# Patient Record
Sex: MALE | NOT HISPANIC OR LATINO | Employment: OTHER | URBAN - NONMETROPOLITAN AREA
[De-identification: names, ages, dates, MRNs, and addresses within clinical notes are randomized per-mention and may not be internally consistent; named-entity substitution may affect disease eponyms.]

---

## 2023-03-29 ENCOUNTER — TELEPHONE (OUTPATIENT)
Dept: PRIMARY CARE | Facility: CLINIC | Age: 83
End: 2023-03-29
Payer: MEDICARE

## 2023-03-29 DIAGNOSIS — J02.9 ACUTE PHARYNGITIS, UNSPECIFIED ETIOLOGY: Primary | ICD-10-CM

## 2023-03-29 RX ORDER — AZITHROMYCIN 500 MG/1
500 TABLET, FILM COATED ORAL DAILY
Qty: 5 TABLET | Refills: 0 | Status: SHIPPED | OUTPATIENT
Start: 2023-03-29 | End: 2023-04-06 | Stop reason: ALTCHOICE

## 2023-03-29 NOTE — TELEPHONE ENCOUNTER
Patient called stating he has had 3-4 days of HA, sinus congestion with clear drainage, non-productive cough.  Denies fever.  Requesting medications to [facility].

## 2023-04-06 ENCOUNTER — OFFICE VISIT (OUTPATIENT)
Dept: PRIMARY CARE | Facility: CLINIC | Age: 83
End: 2023-04-06
Payer: MEDICARE

## 2023-04-06 VITALS
BODY MASS INDEX: 17.58 KG/M2 | SYSTOLIC BLOOD PRESSURE: 118 MMHG | HEART RATE: 85 BPM | DIASTOLIC BLOOD PRESSURE: 74 MMHG | WEIGHT: 116 LBS | HEIGHT: 68 IN

## 2023-04-06 DIAGNOSIS — K59.00 CONSTIPATION, UNSPECIFIED CONSTIPATION TYPE: ICD-10-CM

## 2023-04-06 DIAGNOSIS — R63.4 UNINTENTIONAL WEIGHT LOSS: ICD-10-CM

## 2023-04-06 DIAGNOSIS — Z09 HOSPITAL DISCHARGE FOLLOW-UP: ICD-10-CM

## 2023-04-06 DIAGNOSIS — F32.A MILD DEPRESSION: ICD-10-CM

## 2023-04-06 DIAGNOSIS — I35.0 AORTIC STENOSIS, MODERATE: ICD-10-CM

## 2023-04-06 DIAGNOSIS — R35.0 BENIGN PROSTATIC HYPERPLASIA WITH URINARY FREQUENCY: ICD-10-CM

## 2023-04-06 DIAGNOSIS — J44.9 OBSTRUCTIVE LUNG DISEASE (MULTI): Primary | ICD-10-CM

## 2023-04-06 DIAGNOSIS — E78.2 MIXED HYPERLIPIDEMIA: ICD-10-CM

## 2023-04-06 DIAGNOSIS — I48.11 LONGSTANDING PERSISTENT ATRIAL FIBRILLATION (MULTI): ICD-10-CM

## 2023-04-06 DIAGNOSIS — K21.9 GASTROESOPHAGEAL REFLUX DISEASE WITHOUT ESOPHAGITIS: ICD-10-CM

## 2023-04-06 DIAGNOSIS — K59.09 OTHER CONSTIPATION: ICD-10-CM

## 2023-04-06 DIAGNOSIS — N40.1 BENIGN PROSTATIC HYPERPLASIA WITH URINARY FREQUENCY: ICD-10-CM

## 2023-04-06 DIAGNOSIS — I10 BENIGN HYPERTENSION: ICD-10-CM

## 2023-04-06 DIAGNOSIS — L89.156 PRESSURE INJURY OF DEEP TISSUE OF SACRAL REGION: ICD-10-CM

## 2023-04-06 PROBLEM — L89.159 SACRAL PRESSURE SORE: Status: ACTIVE | Noted: 2023-04-06

## 2023-04-06 PROBLEM — I48.91 ATRIAL FIBRILLATION (MULTI): Status: ACTIVE | Noted: 2023-04-06

## 2023-04-06 PROBLEM — R11.10 VOMITING: Status: RESOLVED | Noted: 2023-04-06 | Resolved: 2023-04-06

## 2023-04-06 PROBLEM — H53.8 BLURRY VISION: Status: ACTIVE | Noted: 2023-04-06

## 2023-04-06 PROBLEM — R11.10 VOMITING: Status: ACTIVE | Noted: 2023-04-06

## 2023-04-06 PROBLEM — A08.4 VIRAL GASTROENTERITIS: Status: RESOLVED | Noted: 2023-04-06 | Resolved: 2023-04-06

## 2023-04-06 PROBLEM — J90 BILATERAL PLEURAL EFFUSION: Status: ACTIVE | Noted: 2023-04-06

## 2023-04-06 PROBLEM — M54.9 BACK PAIN: Status: ACTIVE | Noted: 2023-04-06

## 2023-04-06 PROBLEM — J30.2 SEASONAL ALLERGIES: Status: ACTIVE | Noted: 2023-04-06

## 2023-04-06 PROBLEM — T78.40XA ALLERGIES: Status: ACTIVE | Noted: 2023-04-06

## 2023-04-06 PROBLEM — M67.40 GANGLION CYST: Status: ACTIVE | Noted: 2023-04-06

## 2023-04-06 PROBLEM — J45.50 SEVERE PERSISTENT ASTHMA (MULTI): Status: ACTIVE | Noted: 2023-04-06

## 2023-04-06 PROBLEM — B37.49 CANDIDA UTI: Status: ACTIVE | Noted: 2023-04-06

## 2023-04-06 PROBLEM — L98.9 SKIN LESION: Status: ACTIVE | Noted: 2023-04-06

## 2023-04-06 PROBLEM — H65.92: Status: ACTIVE | Noted: 2023-04-06

## 2023-04-06 PROBLEM — Q23.1 AORTIC REGURGITATION, CONGENITAL (HHS-HCC): Status: ACTIVE | Noted: 2023-04-06

## 2023-04-06 PROBLEM — M71.9 BURSITIS: Status: ACTIVE | Noted: 2023-04-06

## 2023-04-06 PROBLEM — R19.7 DIARRHEA: Status: ACTIVE | Noted: 2023-04-06

## 2023-04-06 PROBLEM — E78.5 HYPERLIPIDEMIA: Status: ACTIVE | Noted: 2023-04-06

## 2023-04-06 PROBLEM — J06.9 ACUTE URI: Status: ACTIVE | Noted: 2023-04-06

## 2023-04-06 PROBLEM — H81.10 BPPV (BENIGN PAROXYSMAL POSITIONAL VERTIGO): Status: ACTIVE | Noted: 2023-04-06

## 2023-04-06 PROBLEM — A08.4 VIRAL GASTROENTERITIS: Status: ACTIVE | Noted: 2023-04-06

## 2023-04-06 PROBLEM — R60.0 LOWER EXTREMITY EDEMA: Status: ACTIVE | Noted: 2023-04-06

## 2023-04-06 PROBLEM — N40.0 BPH (BENIGN PROSTATIC HYPERPLASIA): Status: ACTIVE | Noted: 2023-04-06

## 2023-04-06 PROBLEM — D63.8 ANEMIA, CHRONIC DISEASE: Status: ACTIVE | Noted: 2023-04-06

## 2023-04-06 PROBLEM — M47.812 SPONDYLOSIS OF CERVICAL REGION WITHOUT MYELOPATHY OR RADICULOPATHY: Status: ACTIVE | Noted: 2023-04-06

## 2023-04-06 PROBLEM — G47.33 OBSTRUCTIVE SLEEP APNEA: Status: ACTIVE | Noted: 2023-04-06

## 2023-04-06 PROBLEM — R42 DIZZINESS: Status: ACTIVE | Noted: 2023-04-06

## 2023-04-06 PROBLEM — H61.22 IMPACTED CERUMEN OF LEFT EAR: Status: ACTIVE | Noted: 2023-04-06

## 2023-04-06 PROBLEM — K44.9 HIATAL HERNIA: Status: ACTIVE | Noted: 2023-04-06

## 2023-04-06 PROBLEM — R07.9 CHEST PAIN: Status: ACTIVE | Noted: 2023-03-31

## 2023-04-06 PROBLEM — I65.29 CAROTID ARTERY STENOSIS: Status: ACTIVE | Noted: 2023-04-06

## 2023-04-06 PROBLEM — R13.10 DYSPHAGIA: Status: ACTIVE | Noted: 2023-04-06

## 2023-04-06 PROCEDURE — 1160F RVW MEDS BY RX/DR IN RCRD: CPT | Performed by: INTERNAL MEDICINE

## 2023-04-06 PROCEDURE — 1036F TOBACCO NON-USER: CPT | Performed by: INTERNAL MEDICINE

## 2023-04-06 PROCEDURE — 1159F MED LIST DOCD IN RCRD: CPT | Performed by: INTERNAL MEDICINE

## 2023-04-06 PROCEDURE — 3078F DIAST BP <80 MM HG: CPT | Performed by: INTERNAL MEDICINE

## 2023-04-06 PROCEDURE — 3074F SYST BP LT 130 MM HG: CPT | Performed by: INTERNAL MEDICINE

## 2023-04-06 PROCEDURE — 99214 OFFICE O/P EST MOD 30 MIN: CPT | Performed by: INTERNAL MEDICINE

## 2023-04-06 PROCEDURE — 1157F ADVNC CARE PLAN IN RCRD: CPT | Performed by: INTERNAL MEDICINE

## 2023-04-06 RX ORDER — PANTOPRAZOLE SODIUM 40 MG/1
40 TABLET, DELAYED RELEASE ORAL 2 TIMES DAILY
COMMUNITY
Start: 2019-05-25 | End: 2023-06-30 | Stop reason: SDUPTHER

## 2023-04-06 RX ORDER — METOPROLOL TARTRATE 25 MG/1
1 TABLET, FILM COATED ORAL 2 TIMES DAILY
COMMUNITY
Start: 2022-04-26 | End: 2023-05-19 | Stop reason: SDUPTHER

## 2023-04-06 RX ORDER — CITALOPRAM 10 MG/1
10 TABLET ORAL DAILY
Qty: 30 TABLET | Refills: 5 | Status: SHIPPED | OUTPATIENT
Start: 2023-04-06 | End: 2023-05-19 | Stop reason: ALTCHOICE

## 2023-04-06 RX ORDER — TAMSULOSIN HYDROCHLORIDE 0.4 MG/1
CAPSULE ORAL
COMMUNITY
Start: 2020-01-16 | End: 2024-04-24 | Stop reason: SDUPTHER

## 2023-04-06 RX ORDER — ADHESIVE BANDAGE
30 BANDAGE TOPICAL DAILY
Qty: 360 ML | Refills: 0 | Status: SHIPPED | OUTPATIENT
Start: 2023-04-06 | End: 2023-04-16

## 2023-04-06 RX ORDER — WARFARIN 2 MG/1
2 TABLET ORAL SEE ADMIN INSTRUCTIONS
COMMUNITY
Start: 2022-02-24 | End: 2023-06-22

## 2023-04-06 RX ORDER — NAPROXEN SODIUM 220 MG/1
81 TABLET, FILM COATED ORAL DAILY
COMMUNITY

## 2023-04-06 RX ORDER — NYSTATIN AND TRIAMCINOLONE ACETONIDE 100000; 1 [USP'U]/G; MG/G
CREAM TOPICAL 2 TIMES DAILY
Qty: 15 G | Refills: 1 | Status: SHIPPED | OUTPATIENT
Start: 2023-04-06 | End: 2024-04-24 | Stop reason: WASHOUT

## 2023-04-06 RX ORDER — FLUTICASONE PROPIONATE 50 MCG
2 SPRAY, SUSPENSION (ML) NASAL DAILY
COMMUNITY
End: 2024-04-24 | Stop reason: SDUPTHER

## 2023-04-06 RX ORDER — GABAPENTIN 100 MG/1
100 CAPSULE ORAL NIGHTLY
COMMUNITY
Start: 2022-09-09

## 2023-04-06 RX ORDER — MONTELUKAST SODIUM 10 MG/1
10 TABLET ORAL NIGHTLY
COMMUNITY
End: 2024-02-13

## 2023-04-06 RX ORDER — SERTRALINE HYDROCHLORIDE 50 MG/1
50 TABLET, FILM COATED ORAL DAILY
COMMUNITY
End: 2023-04-06

## 2023-04-06 RX ORDER — DILTIAZEM HYDROCHLORIDE 180 MG/1
1 CAPSULE, COATED, EXTENDED RELEASE ORAL DAILY
COMMUNITY
Start: 2023-01-19 | End: 2023-05-19 | Stop reason: SDUPTHER

## 2023-04-06 RX ORDER — DOCUSATE SODIUM 100 MG/1
100 CAPSULE, LIQUID FILLED ORAL 2 TIMES DAILY
Qty: 60 CAPSULE | Refills: 2 | Status: SHIPPED | OUTPATIENT
Start: 2023-04-06 | End: 2024-04-24 | Stop reason: WASHOUT

## 2023-04-06 RX ORDER — ALBUTEROL SULFATE 90 UG/1
2 AEROSOL, METERED RESPIRATORY (INHALATION) EVERY 6 HOURS
COMMUNITY
Start: 2023-04-02 | End: 2023-10-13 | Stop reason: WASHOUT

## 2023-04-06 ASSESSMENT — ENCOUNTER SYMPTOMS
RECTAL PAIN: 0
APPETITE CHANGE: 0
FEVER: 0
DIZZINESS: 0
DIFFICULTY URINATING: 0
ACTIVITY CHANGE: 0
NAUSEA: 0
RHINORRHEA: 0
CHILLS: 0
MYALGIAS: 0
FLANK PAIN: 0
DEPRESSION: 1

## 2023-04-06 ASSESSMENT — PATIENT HEALTH QUESTIONNAIRE - PHQ9
SUM OF ALL RESPONSES TO PHQ9 QUESTIONS 1 AND 2: 2
1. LITTLE INTEREST OR PLEASURE IN DOING THINGS: SEVERAL DAYS
2. FEELING DOWN, DEPRESSED OR HOPELESS: SEVERAL DAYS

## 2023-04-06 NOTE — ASSESSMENT & PLAN NOTE
Reviewed hospital records from [facility], diagnosed with CAP and acute on chronic respiratory failure with hypoxia, incidentally found kidney stone. PT finished antibiotics on discharge ,reports feeling better still a little weak.  Had follow up with Dr Zhou to devise treatment plan of kidney stone .

## 2023-04-06 NOTE — ASSESSMENT & PLAN NOTE
- Unintentional weight loss likely multifactorial, depression, dysphagia, decreased appetite, intermittent vomiting or food getting stuck, lonely, does not like cooking for himself, weight has been stable, down to 116   - having some trouble sleeping   - pt agreeable to try a different SSRI, will try celexa 10mg po daily   - pt declines counseling referral

## 2023-04-06 NOTE — PROGRESS NOTES
Subjective   Patient ID: Evangelina Braxton is a 82 y.o. male who presents for Hospital Follow-up (Pt was diagnosed with pneumonia 1 week ago/Admitted to [facility] for 4 days/Pt reports weakness and fatigue; states its rough getting his strength back/Reports no appetite and no BM since last Thursday; trying to eat more fiber when he is hungry/He was also told he has kidney stones; following Dr. Zhou) and Depression (Stopped taking the Zoloft; wasn't sure it was helping/Pt states his daughter thinks he needs to be on medication/Pt reports a lot of depression in the hospital; states it really messed him up).  Depression    Patient is here today for Hospital follow up.  Pt states that he had the chills and a cough, he states that it started the day before he went to the hospital all of a sudden. He was found to have a left sided pna and was admited for acute on chronic respiratory failure with hypoxia due to copd. He had incidental finding of a kidney stone.     He saw Dr Zhou yesterday and is supposed to have a KUB and will finalize treatment plan for kidney stone, pt denies blood in his urine, urinary frequency or flank pain.     Pt reports feeling depressed, had tried sertraline but that did not help. Will try a different SSRI.     He has not had a bowel movement in over a week.      Review of Systems   Constitutional:  Negative for activity change, appetite change, chills and fever.   HENT:  Negative for congestion, nosebleeds and rhinorrhea.    Cardiovascular:  Negative for chest pain and leg swelling.   Gastrointestinal:  Negative for nausea and rectal pain.   Genitourinary:  Negative for decreased urine volume, difficulty urinating, flank pain and urgency.   Musculoskeletal:  Negative for myalgias.   Neurological:  Negative for dizziness.   Psychiatric/Behavioral:  Positive for depression.         +depression       Objective   /74 (BP Location: Right arm, Patient Position: Sitting, BP Cuff Size: Adult)    "Pulse 85   Ht 1.727 m (5' 8\")   Wt 52.6 kg (116 lb)   BMI 17.64 kg/m²     Physical Exam  Constitutional:       General: He is not in acute distress.     Appearance: Normal appearance.   HENT:      Head: Normocephalic.      Nose: Nose normal.      Mouth/Throat:      Mouth: Mucous membranes are dry.      Pharynx: No oropharyngeal exudate.   Eyes:      General:         Right eye: No discharge.         Left eye: No discharge.      Extraocular Movements: Extraocular movements intact.      Pupils: Pupils are equal, round, and reactive to light.   Cardiovascular:      Rate and Rhythm: Normal rate. Rhythm irregular.      Heart sounds: No murmur heard.     No gallop.   Pulmonary:      Effort: Pulmonary effort is normal. No respiratory distress.      Breath sounds: Normal breath sounds. No wheezing.   Musculoskeletal:         General: No swelling. Normal range of motion.   Skin:     General: Skin is warm and dry.      Coloration: Skin is not jaundiced.   Neurological:      General: No focal deficit present.      Mental Status: He is alert and oriented to person, place, and time.      Cranial Nerves: No cranial nerve deficit.   Psychiatric:         Mood and Affect: Mood normal.         Behavior: Behavior normal.           Assessment/Plan   Problem List Items Addressed This Visit          Respiratory    Obstructive lung disease (CMS/HCC) - Primary     - continue singulair 10mg po dialy   - continue flonase   - albuterol prn             Circulatory    Aortic stenosis, moderate    Atrial fibrillation (CMS/HCC)     - continue Cardizem   - continue warfarin as directed by [place]   - continue metoprolol           Benign hypertension     - controlled  - continue metoprolol, cardizem             Digestive    GERD (gastroesophageal reflux disease)     - s/p nissen  - had return of gerd symptoms and vomiting   - continue ppi          Other constipation     - will order colace 100mg po bid   - milk of magnesia if does not have a bowel " movement in next few days he is to call and will rx lactulose             Genitourinary    BPH (benign prostatic hyperplasia)       Endocrine/Metabolic    Unintentional weight loss     - Unintentional weight loss likely multifactorial, depression, dysphagia, decreased appetite, intermittent vomiting or food getting stuck, lonely, does not like cooking for himself, weight has been stable, down to 116   - having some trouble sleeping   - pt agreeable to try a different SSRI, will try celexa 10mg po daily   - pt declines counseling referral             Other    Hyperlipidemia    Mild depression     - will start celexa 10mg po daily          Relevant Medications    citalopram (CeleXA) 10 mg tablet    Sacral pressure sore     - send different rx to try          Relevant Medications    nystatin-triamcinolone (Mycolog II) cream    Hospital discharge follow-up     Reviewed hospital records from [facility], diagnosed with CAP and acute on chronic respiratory failure with hypoxia, incidentally found kidney stone. PT finished antibiotics on discharge ,reports feeling better still a little weak.  Had follow up with Dr Zhou to devise treatment plan of kidney stone .          Other Visit Diagnoses       Constipation, unspecified constipation type        Relevant Medications    docusate sodium (Colace) 100 mg capsule    magnesium hydroxide (Milk of Magnesia) 400 mg/5 mL suspension          Will see him back in 6 weeks for weight check and depression follow up      Final diagnoses:   [J44.9] Obstructive lung disease (CMS/HCC)   [I35.0] Aortic stenosis, moderate   [I48.11] Longstanding persistent atrial fibrillation (CMS/HCC)   [I10] Benign hypertension   [K21.9] Gastroesophageal reflux disease without esophagitis   [N40.1, R35.0] Benign prostatic hyperplasia with urinary frequency   [E78.2] Mixed hyperlipidemia   [F32.A] Mild depression   [L89.156] Pressure injury of deep tissue of sacral region   [K59.00] Constipation,  unspecified constipation type   [K59.09] Other constipation   [R63.4] Unintentional weight loss   [Z09] Hospital discharge follow-up

## 2023-04-06 NOTE — ASSESSMENT & PLAN NOTE
- will order colace 100mg po bid   - milk of magnesia if does not have a bowel movement in next few days he is to call and will rx lactulose

## 2023-05-19 ENCOUNTER — OFFICE VISIT (OUTPATIENT)
Dept: PRIMARY CARE | Facility: CLINIC | Age: 83
End: 2023-05-19
Payer: MEDICARE

## 2023-05-19 VITALS
BODY MASS INDEX: 18.94 KG/M2 | WEIGHT: 125 LBS | SYSTOLIC BLOOD PRESSURE: 118 MMHG | DIASTOLIC BLOOD PRESSURE: 66 MMHG | HEART RATE: 84 BPM | HEIGHT: 68 IN

## 2023-05-19 DIAGNOSIS — I48.11 LONGSTANDING PERSISTENT ATRIAL FIBRILLATION (MULTI): Primary | ICD-10-CM

## 2023-05-19 DIAGNOSIS — F32.A MILD DEPRESSION: ICD-10-CM

## 2023-05-19 PROCEDURE — 3078F DIAST BP <80 MM HG: CPT | Performed by: INTERNAL MEDICINE

## 2023-05-19 PROCEDURE — 1157F ADVNC CARE PLAN IN RCRD: CPT | Performed by: INTERNAL MEDICINE

## 2023-05-19 PROCEDURE — 99213 OFFICE O/P EST LOW 20 MIN: CPT | Performed by: INTERNAL MEDICINE

## 2023-05-19 PROCEDURE — 1159F MED LIST DOCD IN RCRD: CPT | Performed by: INTERNAL MEDICINE

## 2023-05-19 PROCEDURE — 1160F RVW MEDS BY RX/DR IN RCRD: CPT | Performed by: INTERNAL MEDICINE

## 2023-05-19 PROCEDURE — 1036F TOBACCO NON-USER: CPT | Performed by: INTERNAL MEDICINE

## 2023-05-19 PROCEDURE — 3074F SYST BP LT 130 MM HG: CPT | Performed by: INTERNAL MEDICINE

## 2023-05-19 RX ORDER — DILTIAZEM HYDROCHLORIDE 180 MG/1
180 CAPSULE, COATED, EXTENDED RELEASE ORAL DAILY
Qty: 90 CAPSULE | Refills: 2 | Status: SHIPPED | OUTPATIENT
Start: 2023-05-19 | End: 2023-06-30 | Stop reason: SDUPTHER

## 2023-05-19 RX ORDER — METOPROLOL TARTRATE 25 MG/1
25 TABLET, FILM COATED ORAL 2 TIMES DAILY
Qty: 180 TABLET | Refills: 3 | Status: SHIPPED | OUTPATIENT
Start: 2023-05-19 | End: 2024-04-24 | Stop reason: SDUPTHER

## 2023-05-19 RX ORDER — CITALOPRAM 20 MG/1
20 TABLET, FILM COATED ORAL DAILY
Qty: 30 TABLET | Refills: 1 | Status: SHIPPED | OUTPATIENT
Start: 2023-05-19 | End: 2023-12-12 | Stop reason: ALTCHOICE

## 2023-05-19 ASSESSMENT — PATIENT HEALTH QUESTIONNAIRE - PHQ9
1. LITTLE INTEREST OR PLEASURE IN DOING THINGS: MORE THAN HALF THE DAYS
2. FEELING DOWN, DEPRESSED OR HOPELESS: MORE THAN HALF THE DAYS
SUM OF ALL RESPONSES TO PHQ9 QUESTIONS 1 AND 2: 4

## 2023-05-19 ASSESSMENT — ENCOUNTER SYMPTOMS: DEPRESSION: 1

## 2023-05-19 NOTE — PROGRESS NOTES
"Subjective   Patient ID: Evangelina Braxton is a 82 y.o. male who presents for Depression (+screening/Celexa not working ) and Follow-up (6 week).  Depression    Patient is here today for 6 week follow up on depression.  HE reports that he has not noticed much of a change with the medication yet but no side effects with it.   Discussed counseling, he is thinking about it.   He used to see them at the [place].     Review of Systems   Psychiatric/Behavioral:  Positive for depression.         +depression       Objective   /66 (BP Location: Right arm, Patient Position: Sitting, BP Cuff Size: Adult)   Pulse 84   Ht 1.727 m (5' 8\")   Wt 56.7 kg (125 lb)   BMI 19.01 kg/m²     Physical Exam  Constitutional:       General: He is not in acute distress.     Appearance: Normal appearance.   Neurological:      Mental Status: He is alert.   Psychiatric:         Mood and Affect: Mood normal.         Behavior: Behavior normal.         Thought Content: Thought content normal.           Assessment/Plan   Problem List Items Addressed This Visit          Other    Mild depression     Depression, no change so far   - will increase celexa to 20mg po daily   - recommend counseling [facility]   - follow up in 6 weeks   Final diagnoses:   [F32.A] Mild depression     "

## 2023-05-24 ENCOUNTER — APPOINTMENT (OUTPATIENT)
Dept: PRIMARY CARE | Facility: CLINIC | Age: 83
End: 2023-05-24
Payer: MEDICARE

## 2023-06-22 DIAGNOSIS — I48.11 LONGSTANDING PERSISTENT ATRIAL FIBRILLATION (MULTI): ICD-10-CM

## 2023-06-22 DIAGNOSIS — I35.0 AORTIC STENOSIS, MODERATE: Primary | ICD-10-CM

## 2023-06-22 RX ORDER — WARFARIN 2 MG/1
TABLET ORAL
Qty: 90 TABLET | Refills: 1 | Status: SHIPPED | OUTPATIENT
Start: 2023-06-22 | End: 2024-04-04

## 2023-06-30 ENCOUNTER — OFFICE VISIT (OUTPATIENT)
Dept: PRIMARY CARE | Facility: CLINIC | Age: 83
End: 2023-06-30
Payer: MEDICARE

## 2023-06-30 VITALS
HEART RATE: 86 BPM | WEIGHT: 124 LBS | SYSTOLIC BLOOD PRESSURE: 126 MMHG | BODY MASS INDEX: 18.79 KG/M2 | DIASTOLIC BLOOD PRESSURE: 65 MMHG | HEIGHT: 68 IN

## 2023-06-30 DIAGNOSIS — F32.A MILD DEPRESSION: ICD-10-CM

## 2023-06-30 DIAGNOSIS — J44.9 OBSTRUCTIVE LUNG DISEASE (MULTI): ICD-10-CM

## 2023-06-30 DIAGNOSIS — I48.11 LONGSTANDING PERSISTENT ATRIAL FIBRILLATION (MULTI): ICD-10-CM

## 2023-06-30 DIAGNOSIS — J06.9 ACUTE URI: Primary | ICD-10-CM

## 2023-06-30 PROCEDURE — 3078F DIAST BP <80 MM HG: CPT | Performed by: INTERNAL MEDICINE

## 2023-06-30 PROCEDURE — 1159F MED LIST DOCD IN RCRD: CPT | Performed by: INTERNAL MEDICINE

## 2023-06-30 PROCEDURE — 3074F SYST BP LT 130 MM HG: CPT | Performed by: INTERNAL MEDICINE

## 2023-06-30 PROCEDURE — 99213 OFFICE O/P EST LOW 20 MIN: CPT | Performed by: INTERNAL MEDICINE

## 2023-06-30 PROCEDURE — 1036F TOBACCO NON-USER: CPT | Performed by: INTERNAL MEDICINE

## 2023-06-30 PROCEDURE — 1157F ADVNC CARE PLAN IN RCRD: CPT | Performed by: INTERNAL MEDICINE

## 2023-06-30 PROCEDURE — 1160F RVW MEDS BY RX/DR IN RCRD: CPT | Performed by: INTERNAL MEDICINE

## 2023-06-30 RX ORDER — CITALOPRAM 40 MG/1
40 TABLET, FILM COATED ORAL DAILY
Qty: 30 TABLET | Refills: 1 | Status: SHIPPED | OUTPATIENT
Start: 2023-06-30 | End: 2023-08-25 | Stop reason: ALTCHOICE

## 2023-06-30 RX ORDER — AMOXICILLIN AND CLAVULANATE POTASSIUM 875; 125 MG/1; MG/1
875 TABLET, FILM COATED ORAL 2 TIMES DAILY
Qty: 20 TABLET | Refills: 0 | Status: SHIPPED | OUTPATIENT
Start: 2023-06-30 | End: 2023-07-10

## 2023-06-30 RX ORDER — DILTIAZEM HYDROCHLORIDE 180 MG/1
180 CAPSULE, COATED, EXTENDED RELEASE ORAL DAILY
Qty: 90 CAPSULE | Refills: 2 | Status: SHIPPED | OUTPATIENT
Start: 2023-06-30 | End: 2023-12-12 | Stop reason: SDUPTHER

## 2023-06-30 RX ORDER — PANTOPRAZOLE SODIUM 40 MG/1
40 TABLET, DELAYED RELEASE ORAL 2 TIMES DAILY
Qty: 180 TABLET | Refills: 1 | Status: SHIPPED | OUTPATIENT
Start: 2023-06-30 | End: 2024-02-06 | Stop reason: SDUPTHER

## 2023-06-30 ASSESSMENT — ENCOUNTER SYMPTOMS
VOMITING: 0
SINUS PAIN: 0
ABDOMINAL DISTENTION: 0
BACK PAIN: 0
SHORTNESS OF BREATH: 0
DIARRHEA: 0
SINUS PRESSURE: 1
DEPRESSION: 1
NUMBNESS: 0
APPETITE CHANGE: 0
WEAKNESS: 0
NAUSEA: 0
FATIGUE: 0
CHILLS: 0
ACTIVITY CHANGE: 0
COUGH: 0
WHEEZING: 0
SORE THROAT: 0

## 2023-06-30 ASSESSMENT — PATIENT HEALTH QUESTIONNAIRE - PHQ9
1. LITTLE INTEREST OR PLEASURE IN DOING THINGS: MORE THAN HALF THE DAYS
SUM OF ALL RESPONSES TO PHQ9 QUESTIONS 1 AND 2: 4
2. FEELING DOWN, DEPRESSED OR HOPELESS: MORE THAN HALF THE DAYS

## 2023-06-30 NOTE — PROGRESS NOTES
"Subjective   Patient ID: Evagnelina Braxton is a 82 y.o. male who presents for Follow-up (1 month/Went to  and was given prednisone and ATB due to his congestion/Still feeling a little crummy; cough and runny nose  ) and Depression (Worsening /x3 sessions of counseling ).  DepressionPatient is not experiencing: shortness of breath.        Patient is here today for 1 mo follow up on depression.  PT has been seeing Counselor at the [facility], has done 3 sessions.  No noticed any difference with the 20mg of celexa  No side effects .     Pt was seen at the Urgent Care last Wed for cough and congestion, no fever. He was given antibiotic, he does feel like it is better.     Review of Systems   Constitutional:  Negative for activity change, appetite change, chills and fatigue.   HENT:  Positive for sinus pressure. Negative for congestion, postnasal drip, sinus pain and sore throat.    Respiratory:  Negative for cough, shortness of breath and wheezing.    Cardiovascular:  Negative for chest pain and leg swelling.   Gastrointestinal:  Negative for abdominal distention, diarrhea, nausea and vomiting.   Musculoskeletal:  Negative for back pain.   Neurological:  Negative for weakness and numbness.   Psychiatric/Behavioral:  Positive for depression.        Objective   /65 (BP Location: Right arm, Patient Position: Sitting, BP Cuff Size: Adult)   Pulse 86   Ht 1.727 m (5' 8\")   Wt 56.2 kg (124 lb)   BMI 18.85 kg/m²     Physical Exam  Constitutional:       General: He is not in acute distress.     Appearance: Normal appearance. He is not toxic-appearing.   HENT:      Head: Normocephalic and atraumatic.      Nose: Nose normal.      Mouth/Throat:      Mouth: Mucous membranes are moist.      Pharynx: Oropharynx is clear.   Eyes:      Extraocular Movements: Extraocular movements intact.      Conjunctiva/sclera: Conjunctivae normal.      Pupils: Pupils are equal, round, and reactive to light.   Cardiovascular:      Rate and " Rhythm: Normal rate and regular rhythm.      Heart sounds: No murmur heard.     No friction rub. No gallop.   Pulmonary:      Effort: Pulmonary effort is normal.      Breath sounds: Rhonchi present.   Abdominal:      General: Bowel sounds are normal. There is no distension.      Palpations: Abdomen is soft. There is no mass.      Tenderness: There is no abdominal tenderness. There is no guarding.   Musculoskeletal:         General: No swelling. Normal range of motion.      Cervical back: Normal range of motion.   Skin:     General: Skin is warm and dry.   Neurological:      General: No focal deficit present.      Mental Status: He is alert and oriented to person, place, and time.   Psychiatric:         Mood and Affect: Mood normal.         Thought Content: Thought content normal.         Judgment: Judgment normal.           Assessment/Plan   Problem List Items Addressed This Visit       Acute URI - Primary    Relevant Medications    amoxicillin-pot clavulanate (Augmentin) 875-125 mg tablet    Atrial fibrillation (CMS/HCC)    Relevant Medications    pantoprazole (ProtoNix) 40 mg EC tablet    dilTIAZem CD (Cardizem CD) 180 mg 24 hr capsule    Obstructive lung disease (CMS/HCC)    Mild depression    Relevant Medications    citalopram (CeleXA) 40 mg tablet     COPD exacerbation  - treated with doxycycline and prednisone   - has some rhonchi, advised if he ggets worse over holiday weekend to start augmentin and let [facility] know at Coumadin clinic     2. Depression, worsening    Will increase to celexa 40mg po daily   - continue counseling     3. Follow up in 2 mo   Final diagnoses:   [F32.A] Mild depression   [J06.9] Acute URI   [I48.11] Longstanding persistent atrial fibrillation (CMS/HCC)   [J44.9] Obstructive lung disease (CMS/HCC)

## 2023-08-25 ENCOUNTER — OFFICE VISIT (OUTPATIENT)
Dept: PRIMARY CARE | Facility: CLINIC | Age: 83
End: 2023-08-25
Payer: MEDICARE

## 2023-08-25 VITALS
WEIGHT: 120 LBS | DIASTOLIC BLOOD PRESSURE: 54 MMHG | BODY MASS INDEX: 18.19 KG/M2 | SYSTOLIC BLOOD PRESSURE: 107 MMHG | HEIGHT: 68 IN | HEART RATE: 85 BPM

## 2023-08-25 DIAGNOSIS — F32.9 REACTIVE DEPRESSION: Primary | ICD-10-CM

## 2023-08-25 PROCEDURE — 1157F ADVNC CARE PLAN IN RCRD: CPT | Performed by: INTERNAL MEDICINE

## 2023-08-25 PROCEDURE — 1126F AMNT PAIN NOTED NONE PRSNT: CPT | Performed by: INTERNAL MEDICINE

## 2023-08-25 PROCEDURE — 3074F SYST BP LT 130 MM HG: CPT | Performed by: INTERNAL MEDICINE

## 2023-08-25 PROCEDURE — 1036F TOBACCO NON-USER: CPT | Performed by: INTERNAL MEDICINE

## 2023-08-25 PROCEDURE — 1160F RVW MEDS BY RX/DR IN RCRD: CPT | Performed by: INTERNAL MEDICINE

## 2023-08-25 PROCEDURE — 99213 OFFICE O/P EST LOW 20 MIN: CPT | Performed by: INTERNAL MEDICINE

## 2023-08-25 PROCEDURE — 3078F DIAST BP <80 MM HG: CPT | Performed by: INTERNAL MEDICINE

## 2023-08-25 PROCEDURE — 1159F MED LIST DOCD IN RCRD: CPT | Performed by: INTERNAL MEDICINE

## 2023-08-25 RX ORDER — VENLAFAXINE HYDROCHLORIDE 75 MG/1
75 CAPSULE, EXTENDED RELEASE ORAL DAILY
Qty: 30 CAPSULE | Refills: 5 | Status: SHIPPED | OUTPATIENT
Start: 2023-08-25 | End: 2023-12-12 | Stop reason: ALTCHOICE

## 2023-08-25 ASSESSMENT — ENCOUNTER SYMPTOMS
DIARRHEA: 0
APPETITE CHANGE: 0
ACTIVITY CHANGE: 0
WHEEZING: 0
BACK PAIN: 0
ABDOMINAL DISTENTION: 0
NUMBNESS: 0
SINUS PRESSURE: 0
NAUSEA: 0
VOMITING: 0
COUGH: 0
FATIGUE: 0
CHILLS: 0
SINUS PAIN: 0
SHORTNESS OF BREATH: 0
WEAKNESS: 0
SORE THROAT: 0

## 2023-08-25 NOTE — PROGRESS NOTES
"Subjective   Patient ID: Evangelina Braxton is a 82 y.o. male who presents for Follow-up (2 month).  HPI  Patient is here today for 2 mo follow up on depression.  Patient does not feel like the higher doze of celexa has helped.  He is continuing to see his counselor.  HE has been taking some shanique trips and getting out.     Review of Systems   Constitutional:  Negative for activity change, appetite change, chills and fatigue.   HENT:  Negative for congestion, postnasal drip, sinus pressure, sinus pain and sore throat.    Respiratory:  Negative for cough, shortness of breath and wheezing.    Cardiovascular:  Negative for chest pain and leg swelling.   Gastrointestinal:  Negative for abdominal distention, diarrhea, nausea and vomiting.   Musculoskeletal:  Negative for back pain.   Neurological:  Negative for weakness and numbness.   Psychiatric/Behavioral:          +depression       Objective   /54 (BP Location: Right arm, Patient Position: Sitting, BP Cuff Size: Adult)   Pulse 85   Ht 1.727 m (5' 8\")   Wt 54.4 kg (120 lb)   BMI 18.25 kg/m²     Physical Exam  Constitutional:       General: He is not in acute distress.     Appearance: Normal appearance.   Neurological:      Mental Status: He is alert.   Psychiatric:         Mood and Affect: Mood normal.         Behavior: Behavior normal.         Thought Content: Thought content normal.           Assessment/Plan   Problem List Items Addressed This Visit    None  Visit Diagnoses       Reactive depression    -  Primary    Relevant Medications    venlafaxine XR (Effexor-XR) 75 mg 24 hr capsule          1. Depression, no change    - will stop celexa, and try effexor 75mg po daily   - continue counseling    - I think a lot of it is isolation and loneliness but I think it would be worth a shot to at least try a third agent and see if it helps some with his depression.    3. Follow up in 2 mo  Final diagnoses:   [F32.9] Reactive depression     "

## 2023-09-01 LAB
INR IN PPP BY COAGULATION ASSAY EXTERNAL: 2.5
PROTHROMBIN TIME (PT) IN PPP BY COAGULATION ASSAY EXTERNAL: NORMAL SECONDS

## 2023-09-18 PROBLEM — M19.90 ARTHRITIS: Status: ACTIVE | Noted: 2023-09-18

## 2023-09-18 PROBLEM — R09.02 HYPOXEMIA: Status: ACTIVE | Noted: 2023-09-18

## 2023-09-18 PROBLEM — B35.1 TINEA UNGUIUM: Status: ACTIVE | Noted: 2020-11-18

## 2023-09-18 PROBLEM — I35.8 AORTIC VALVE SCLEROSIS: Status: ACTIVE | Noted: 2023-09-18

## 2023-09-18 PROBLEM — R12 HEARTBURN: Status: ACTIVE | Noted: 2021-11-10

## 2023-09-18 PROBLEM — J44.1 COPD EXACERBATION (MULTI): Status: ACTIVE | Noted: 2023-09-18

## 2023-09-18 PROBLEM — H40.9 GLAUCOMA: Status: ACTIVE | Noted: 2023-09-18

## 2023-09-18 PROBLEM — J96.21 ACUTE ON CHRONIC RESPIRATORY FAILURE WITH HYPOXEMIA (MULTI): Status: ACTIVE | Noted: 2023-09-18

## 2023-09-18 PROBLEM — J18.9 PNEUMONIA: Status: ACTIVE | Noted: 2023-09-18

## 2023-09-18 PROBLEM — N52.9 ERECTILE DYSFUNCTION: Status: ACTIVE | Noted: 2023-09-18

## 2023-09-18 PROBLEM — J45.909 ASTHMA (HHS-HCC): Status: ACTIVE | Noted: 2023-09-18

## 2023-09-18 PROBLEM — N20.0 CALCULUS OF KIDNEY: Status: ACTIVE | Noted: 2023-09-18

## 2023-09-18 PROBLEM — R51.9 HEADACHE: Status: ACTIVE | Noted: 2023-09-18

## 2023-09-18 PROBLEM — N13.30 HYDRONEPHROSIS: Status: ACTIVE | Noted: 2023-09-18

## 2023-09-18 PROBLEM — B35.1 ONYCHOMYCOSIS: Status: ACTIVE | Noted: 2018-03-16

## 2023-09-18 PROBLEM — I83.92 VARICOSE VEINS OF LEFT LOWER EXTREMITY: Status: ACTIVE | Noted: 2023-09-18

## 2023-09-27 DIAGNOSIS — I48.91 ATRIAL FIBRILLATION, UNSPECIFIED TYPE (MULTI): Primary | ICD-10-CM

## 2023-10-02 ENCOUNTER — PHARMACY VISIT (OUTPATIENT)
Dept: PHARMACY | Facility: CLINIC | Age: 83
End: 2023-10-02
Payer: COMMERCIAL

## 2023-10-02 PROCEDURE — RXMED WILLOW AMBULATORY MEDICATION CHARGE

## 2023-10-02 RX ORDER — INFLUENZA A VIRUS A/VICTORIA/4897/2022 IVR-238 (H1N1) ANTIGEN (FORMALDEHYDE INACTIVATED), INFLUENZA A VIRUS A/DARWIN/9/2021 SAN-010 (H3N2) ANTIGEN (FORMALDEHYDE INACTIVATED), INFLUENZA B VIRUS B/PHUKET/3073/2013 ANTIGEN (FORMALDEHYDE INACTIVATED), AND INFLUENZA B VIRUS B/MICHIGAN/01/2021 ANTIGEN (FORMALDEHYDE INACTIVATED) 60; 60; 60; 60 UG/.7ML; UG/.7ML; UG/.7ML; UG/.7ML
0.7 INJECTION, SUSPENSION INTRAMUSCULAR ONCE
Qty: 0.7 ML | Refills: 0 | OUTPATIENT
Start: 2023-10-02 | End: 2023-10-03

## 2023-10-06 ENCOUNTER — ANTICOAGULATION - WARFARIN VISIT (OUTPATIENT)
Dept: PHARMACY | Facility: HOSPITAL | Age: 83
End: 2023-10-06
Payer: MEDICARE

## 2023-10-06 DIAGNOSIS — I48.91 ATRIAL FIBRILLATION, UNSPECIFIED TYPE (MULTI): Primary | ICD-10-CM

## 2023-10-06 LAB
POC INR: 2.2
POC PROTHROMBIN TIME: NORMAL

## 2023-10-06 PROCEDURE — 85610 PROTHROMBIN TIME: CPT | Mod: QW

## 2023-10-06 PROCEDURE — 99211 OFF/OP EST MAY X REQ PHY/QHP: CPT | Performed by: PHARMACIST

## 2023-10-06 NOTE — PROGRESS NOTES
This is a 4 week follow up.    Last INR 2.5    Today, pt denies missed doses, medication/diet changes, no OTC/herbal supplement changes, CP/SOB, fatigue, bleeding or bruising since last visit. Dose verified.    We reviewed and reinforced steady diet and signs and symptoms of VTE. Pt will be vigilant to any increase in severe bruising or bleeding and instructed to call clinic with any questions, concerns, changes, or bleed prior to next visit.     Pt will continue same and follow up as scheduled.

## 2023-10-13 ENCOUNTER — OFFICE VISIT (OUTPATIENT)
Dept: URGENT CARE | Facility: CLINIC | Age: 83
End: 2023-10-13
Payer: COMMERCIAL

## 2023-10-13 VITALS
OXYGEN SATURATION: 94 % | WEIGHT: 125 LBS | TEMPERATURE: 97.6 F | BODY MASS INDEX: 19.62 KG/M2 | RESPIRATION RATE: 22 BRPM | DIASTOLIC BLOOD PRESSURE: 54 MMHG | HEIGHT: 67 IN | HEART RATE: 77 BPM | SYSTOLIC BLOOD PRESSURE: 123 MMHG

## 2023-10-13 DIAGNOSIS — J20.9 ACUTE BRONCHITIS, UNSPECIFIED ORGANISM: Primary | ICD-10-CM

## 2023-10-13 PROCEDURE — 99213 OFFICE O/P EST LOW 20 MIN: CPT | Performed by: NURSE PRACTITIONER

## 2023-10-13 RX ORDER — ALBUTEROL SULFATE 90 UG/1
1-2 AEROSOL, METERED RESPIRATORY (INHALATION) EVERY 4 HOURS PRN
Qty: 8.5 G | Refills: 0 | Status: SHIPPED | OUTPATIENT
Start: 2023-10-13 | End: 2024-04-24 | Stop reason: WASHOUT

## 2023-10-13 RX ORDER — AMOXICILLIN AND CLAVULANATE POTASSIUM 875; 125 MG/1; MG/1
1 TABLET, FILM COATED ORAL 2 TIMES DAILY
Qty: 20 TABLET | Refills: 0 | Status: SHIPPED | OUTPATIENT
Start: 2023-10-13 | End: 2023-10-23

## 2023-10-13 NOTE — PROGRESS NOTES
[facility]  Florecita HÉCTOR Saldivar, APRN-CNP     Visit Note - 10/13/2023 10:27 AM   This note was generated with voice recognition software and may contain errors including spelling, grammar, syntax, and misrecognization of what was dictated.    Patient: Evangelina Braxton, MRN: [id number], 82 y.o., male   PCP: Florecita Carlisle, DO  ------------------------------------  ALLERGIES:   Allergies   Allergen Reactions    Doxycycline Other    Sulfamethoxazole-Trimethoprim Other and Unknown     Nausea and vomiting        CURRENT MEDICATIONS:   Current Outpatient Medications   Medication Instructions    albuterol 90 mcg/actuation inhaler 1-2 puffs, inhalation, Every 4 hours PRN    amoxicillin-pot clavulanate (Augmentin) 875-125 mg tablet 875 mg, oral, 2 times daily, Take with a meal.    aspirin 81 mg, oral, Daily    citalopram (CELEXA) 20 mg, oral, Daily    dilTIAZem CD (CARDIZEM CD) 180 mg, oral, Daily    docusate sodium (COLACE) 100 mg, oral, 2 times daily    fluticasone (Flonase) 50 mcg/actuation nasal spray 2 sprays, Each Nostril, Daily    gabapentin (NEURONTIN) 100 mg, oral, Nightly    L. acidophilus/Bifid. animalis 32 billion cell capsule oral    Lactobacillus acidophilus (PROBIOTIC ORAL) oral    metoprolol tartrate (LOPRESSOR) 25 mg, oral, 2 times daily    montelukast (SINGULAIR) 10 mg, oral, Nightly    nystatin-triamcinolone (Mycolog II) cream Topical, 2 times daily, Apply to affect area twice a day for 7-14 days then as needed for rash.    pantoprazole (PROTONIX) 40 mg, oral, 2 times daily    tamsulosin (Flomax) 0.4 mg 24 hr capsule oral    venlafaxine XR (EFFEXOR-XR) 75 mg, oral, Daily, Take with food.    warfarin (Coumadin) 2 mg tablet take 1 tablet by mouth once daily or as directed BY INR      ------------------------------------  PAST MEDICAL HX:  Patient Active Problem List   Diagnosis    Acute URI    Allergic otitis media of left ear    Allergies    Anemia, chronic disease    Aortic  regurgitation, congenital    Aortic stenosis, moderate    Atrial fibrillation (CMS/HCC)    Back pain    Chest pain    Benign hypertension    Bilateral pleural effusion    Blurry vision    BPH (benign prostatic hyperplasia)    BPPV (benign paroxysmal positional vertigo)    Bursitis    Candida UTI    Carotid artery stenosis    Obstructive lung disease (CMS/HCC)    Diarrhea    Dizziness    Dysphagia    GERD (gastroesophageal reflux disease)    Hiatal hernia    Hyperlipidemia    Impacted cerumen of left ear    Lower extremity edema    Mild depression    Obstructive sleep apnea    Sacral pressure sore    Seasonal allergies    Severe persistent asthma    Skin lesion    Spondylosis of cervical region without myelopathy or radiculopathy    Unintentional weight loss    Ganglion cyst    Other constipation    Hospital discharge follow-up    Hypoxemia    Glaucoma    Headache    Heartburn    Hydronephrosis    Erectile dysfunction    COPD exacerbation (CMS/HCC)    Calculus of kidney    Aortic valve sclerosis    Acute on chronic respiratory failure with hypoxemia (CMS/HCC)    Asthma    Ingrowing nail    Onychomycosis    Tinea unguium    Pneumonia    Varicose veins of left lower extremity    SVT (supraventricular tachycardia)    Arthritis        SURGICAL HX:  Past Surgical History:   Procedure Laterality Date    OTHER SURGICAL HISTORY  06/05/2019    Paraesophageal hiatal hernia repair        FAMILY HX:   No pertinent history.     SOCIAL HX:    reports that he has never smoked. He has never used smokeless tobacco. Has history of workplace exposure to chemicals/respiratory irritants. Lives by himself.    ------------------------------------  CHIEF COMPLAINT:   Chief Complaint   Patient presents with    Cough     Cough, chest congestion, scratchy throat X 1 week         HISTORY OF PRESENT ILLNESS: The history was obtained from patientJe Hutchinson is a 82 y.o. male, who presents with a chief complaint of a harsh, persistent, productive  "cough with white phlegm x approx 7 days. Reports has also had a mildly sore throat, and intermittent wheezing (but reports is not any worse than his baseline, although has been out of albuterol inhaler). Denies any fevers/chills, body aches, ear pain, abdominal pain, chest pain, shortness of breath, rashes, urinary symptoms, nausea/vomiting, and diarrhea. Denies any lightheadedness or dizziness; no changes in mental status. No swelling in legs. Appetite is Poor but is able to eat and drink fluids without difficulty; denies loss of sense of taste or smell. Reports symptoms have are unchanged since onset. Has been taking  Tylenol  without much relief; no other over-the-counter medications or home remedies for symptom management. No known ill contacts. received the COVID vaccine x 2 . desc; COVID infection hx: No known history of COVID infection. . Is not a smoker. . Has history of COPD - does not feel symptoms are currently flared.       REVIEW OF SYSTEMS:  10 systems reviewed negative with exception of history of present illness as listed above.    TODAY'S VITALS: /54   Pulse 77   Temp 36.4 °C (97.6 °F)   Resp 22   Ht 1.702 m (5' 7\")   Wt 56.7 kg (125 lb)   SpO2 94%   BMI 19.58 kg/m²   Recheck SpO2: 95% - patient reports this is his baseline.    PHYSICAL EXAMINATION:  General:  Mildly ill-appearing, well nourished elderly male; alert and oriented; in no acute distress. Sitting comfortably on exam table. Non-dyspneic.   Eyes:  Pupils equal, round and reactive to light. No conjunctival erythema; no scleral icterus.   HENT: No sinus tenderness; + mild audible nasal congestion. Airway patent, Bilat TMs unremarkable, ear canals clear/unremarkable bilaterally. Nasal mucosa mildly injected and edematous. Oral mucosa moist. Posterior pharynx mildly injected but without vesicles or oropharyngeal exudate aside from PND. Uvula is midline. Managing oral secretions without difficulty.  Neck:  Supple. Mildly tender, " mobile anterior cervical lymphadenopathy bilat. Trachea is midline.  Respiratory:  Respirations easy and unlabored, Breath sounds equal. Lungs with few scattered rhonchi that clear with cough; no wheezing or rales. Has good air movement throughout. Harsh, semi-productive cough noted. Non-dyspneic with ambulation; able to maintain SpO2.  Cardiovascular:  Normal rate, Regular rhythm. Normal S1S2. No m/r/g. No peripheral edema.   Gastrointestinal:  Soft, non-tender, non-distended; no palpable masses or organomegaly. Bowel sounds normoactive.  Musculoskeletal:  Grossly normal; appropriate for age.     Integumentary:  Pink, warm, dry, and intact. No rashes or skin discoloration appreciated. Good skin turgor.  Neurologic:  Alert and oriented, no gross deficits.    Cognition and Speech:  Oriented, Speech clear and coherent.    Psychiatric:  Cooperative, Appropriate mood & affect.        ------------------------------------  Medical Decision Making  LABORATORY or RADIOLOGICAL IMAGING ORDERS/RESULTS:   None    IMPRESSION/PLAN:  Course: Worsening; stable    1. Acute bronchitis, unspecified organism  - amoxicillin-pot clavulanate (Augmentin) 875-125 mg tablet; Take 1 tablet (875 mg) by mouth 2 times a day for 10 days. Take with a meal.  Dispense: 20 tablet; Refill: 0  - albuterol 90 mcg/actuation inhaler; Inhale 1-2 puffs every 4 hours if needed for wheezing or shortness of breath.  Dispense: 8.5 g; Refill: 0    No red flags on exam today, but will need close monitoring, especially in light of his COPD. I have reviewed the  COVID-19 algorithm, and counseled pt on COVID-19 current recommendations. Discussed obtaining CXR today but patient requesting to defer; he also declines in-office nebulizer tx. Symptoms consistent with acute bronchitis, but reviewed other potential etiologies. Patient declines testing for COVID/influenza/RSV but urged close monitoring and precautionary measures- should consider home testing. Due to  severity and duration of symptoms, will begin treatment with Augmentin today; will also refill albuterol inhaler for PRN use. Instructed to push fluids, rest, and to use appropriate over the counter medications as needed for management of symptoms - plain Mucinex may be helpful. Reviewed instructions for self-isolation and continued monitoring. Reviewed red flags to monitor for, counseled on potential adverse reactions of treatments, expectations for improvement in sxs, and advised to follow-up with primary care provider in 2-3 days if symptoms persist, or to seek care sooner if worsening or if any additional concerns/red flags develop; should also contact coumadin clinic ASAP to let them know he was started on Augmentin - will likely want to recheck INR on Monday.  Patient agreed with plan of care; questions were encouraged and answered.      KRISTINA Zimmerman-CNP   Advanced Practice Provider  [facility]

## 2023-10-15 PROBLEM — B37.49 CANDIDA UTI: Status: RESOLVED | Noted: 2023-04-06 | Resolved: 2023-10-15

## 2023-10-15 ASSESSMENT — ENCOUNTER SYMPTOMS
ALLERGIC/IMMUNOLOGIC NEGATIVE: 1
EYES NEGATIVE: 1
COUGH: 0
ENDOCRINE NEGATIVE: 1
SHORTNESS OF BREATH: 0
PSYCHIATRIC NEGATIVE: 1
NAUSEA: 0
CHILLS: 0
DIFFICULTY URINATING: 0
FEVER: 0

## 2023-10-15 NOTE — PROGRESS NOTES
Subjective   Patient ID: Evangelina Braxton is a 82 y.o. male who presents for Nephrolithiasis.  Patient is here for a follow up with KUB for hx of kidney stones. 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 No N/V, No F/C.. Chronic BPH sx are mild and stable. Denies urgency and frequency. Denies dysuria. Denies hematuria. Nocturia x1. He is taking Flomax. ED is not an issue. Energy level is good            Review of Systems   Constitutional:  Negative for chills and fever.   HENT: Negative.     Eyes: Negative.    Respiratory:  Negative for cough and shortness of breath.    Cardiovascular:  Negative for chest pain and leg swelling.   Gastrointestinal:  Negative for nausea.   Endocrine: Negative.    Genitourinary:  Negative for difficulty urinating.        Negative except for documented in HPI   Allergic/Immunologic: Negative.    Neurological:         Alert & oriented X 3   Hematological:         On Coumadin   Psychiatric/Behavioral: Negative.         Objective   Physical Exam  Vitals and nursing note reviewed.   Constitutional:       General: He is not in acute distress.     Appearance: Normal appearance.   Pulmonary:      Effort: Pulmonary effort is normal.   Abdominal:      Tenderness: There is no abdominal tenderness.   Genitourinary:     Comments: Kidneys non palpable bilaterally  Bladder non palpable or tender  Scrotum no mass, No hydrocele  Epididymis- No spermatocele. Non Tender.  Testicles: No mass  Urethra: No discharge  Penis within normal limits... No lesions  Prostate - deferred  Neurological:      Mental Status: He is alert.         Assessment/Plan   Problem List Items Addressed This Visit             ICD-10-CM       Genitourinary and Reproductive    BPH  (benign prostatic hyperplasia) N40.0    Erectile dysfunction N52.9    Calculus of kidney N20.0     Other Visit Diagnoses         Codes    Kidney stone    -  Primary N20.0    Relevant Orders    XR abdomen 1 view          All available PSA values reviewed, Options discussed. Questions answered.   Diet changes for prostate health discussed and educational information given. Pros/Cons of prostate health supplements discussed.   Treatment options for LUTS reviewed  Flomax refilled  Discussed timed voiding. Discussed fluid and caffeine intake  Treatment options for ED reviewed.  Lifestyle change to help prevent UTIs discussed. Encouraged fluid intake.  Past Ct and U/S reviewed  KUB reviewed  Repeat U/S ordered for F/U      F/U with renal U/S and for FARRAH 6 months

## 2023-10-18 ENCOUNTER — OFFICE VISIT (OUTPATIENT)
Dept: UROLOGY | Facility: CLINIC | Age: 83
End: 2023-10-18
Payer: COMMERCIAL

## 2023-10-18 ENCOUNTER — ANCILLARY PROCEDURE (OUTPATIENT)
Dept: RADIOLOGY | Facility: CLINIC | Age: 83
End: 2023-10-18
Payer: MEDICARE

## 2023-10-18 VITALS — BODY MASS INDEX: 22.32 KG/M2 | RESPIRATION RATE: 18 BRPM | WEIGHT: 126 LBS | HEIGHT: 63 IN

## 2023-10-18 DIAGNOSIS — N20.0 KIDNEY STONE: ICD-10-CM

## 2023-10-18 DIAGNOSIS — R35.0 BENIGN PROSTATIC HYPERPLASIA WITH URINARY FREQUENCY: ICD-10-CM

## 2023-10-18 DIAGNOSIS — N40.1 BENIGN PROSTATIC HYPERPLASIA WITH URINARY FREQUENCY: ICD-10-CM

## 2023-10-18 DIAGNOSIS — N52.9 ERECTILE DYSFUNCTION, UNSPECIFIED ERECTILE DYSFUNCTION TYPE: ICD-10-CM

## 2023-10-18 DIAGNOSIS — N20.0 CALCULUS OF KIDNEY: ICD-10-CM

## 2023-10-18 DIAGNOSIS — N20.0 KIDNEY STONE: Primary | ICD-10-CM

## 2023-10-18 PROCEDURE — 74018 RADEX ABDOMEN 1 VIEW: CPT | Performed by: RADIOLOGY

## 2023-10-18 PROCEDURE — 74018 RADEX ABDOMEN 1 VIEW: CPT

## 2023-10-18 PROCEDURE — 1036F TOBACCO NON-USER: CPT | Performed by: UROLOGY

## 2023-10-18 PROCEDURE — 1160F RVW MEDS BY RX/DR IN RCRD: CPT | Performed by: UROLOGY

## 2023-10-18 PROCEDURE — 99214 OFFICE O/P EST MOD 30 MIN: CPT | Performed by: UROLOGY

## 2023-10-18 PROCEDURE — 1126F AMNT PAIN NOTED NONE PRSNT: CPT | Performed by: UROLOGY

## 2023-10-18 PROCEDURE — 1159F MED LIST DOCD IN RCRD: CPT | Performed by: UROLOGY

## 2023-10-31 ENCOUNTER — APPOINTMENT (OUTPATIENT)
Dept: PRIMARY CARE | Facility: CLINIC | Age: 83
End: 2023-10-31
Payer: MEDICAID

## 2023-11-03 ENCOUNTER — ANTICOAGULATION - WARFARIN VISIT (OUTPATIENT)
Dept: PHARMACY | Facility: HOSPITAL | Age: 83
End: 2023-11-03
Payer: MEDICARE

## 2023-11-03 ENCOUNTER — APPOINTMENT (OUTPATIENT)
Dept: PRIMARY CARE | Facility: CLINIC | Age: 83
End: 2023-11-03
Payer: MEDICAID

## 2023-11-03 DIAGNOSIS — I48.91 ATRIAL FIBRILLATION, UNSPECIFIED TYPE (MULTI): Primary | ICD-10-CM

## 2023-11-03 LAB
POC INR: 2.1
POC PROTHROMBIN TIME: NORMAL

## 2023-11-03 PROCEDURE — 99211 OFF/OP EST MAY X REQ PHY/QHP: CPT | Performed by: PHARMACIST

## 2023-11-03 PROCEDURE — 85610 PROTHROMBIN TIME: CPT | Mod: QW

## 2023-11-03 NOTE — PROGRESS NOTES
Pt presents to anticoag clinic for 4 week INR check.  Last INR 2.2 on warfarin 10 mg weekly. No changes were made at that time.     Pt notes he finished a course of augmentin about a week ago.      Todays INR is 2.1.    Today, pt denies missed doses, medication/diet changes, no OTC/herbal supplement changes, CP/SOB, fatigue, bleeding or bruising since last visit. Dose verified.    We reviewed and reinforced steady diet and signs and symptoms of VTE. Pt will be vigilant to any increase in severe bruising or bleeding and instructed to call clinic with any questions, concerns, changes, or bleed prior to next visit.    Plan:  Continue with current warfarin dose.  INR in 4 weeks.  Maintain consistent vegetable intake.  Continue monitoring for any troubling bruising or bleeding and call with any medication changes or concerns.    Pt handout given with above information

## 2023-11-20 ENCOUNTER — OFFICE VISIT (OUTPATIENT)
Dept: URGENT CARE | Facility: CLINIC | Age: 83
End: 2023-11-20
Payer: MEDICAID

## 2023-11-20 VITALS
HEART RATE: 100 BPM | WEIGHT: 120 LBS | HEIGHT: 67 IN | DIASTOLIC BLOOD PRESSURE: 69 MMHG | RESPIRATION RATE: 18 BRPM | BODY MASS INDEX: 18.83 KG/M2 | SYSTOLIC BLOOD PRESSURE: 126 MMHG | TEMPERATURE: 98.4 F

## 2023-11-20 DIAGNOSIS — J01.00 ACUTE NON-RECURRENT MAXILLARY SINUSITIS: Primary | ICD-10-CM

## 2023-11-20 PROCEDURE — 3074F SYST BP LT 130 MM HG: CPT | Performed by: NURSE PRACTITIONER

## 2023-11-20 PROCEDURE — 99213 OFFICE O/P EST LOW 20 MIN: CPT | Performed by: NURSE PRACTITIONER

## 2023-11-20 RX ORDER — AMOXICILLIN 875 MG/1
875 TABLET, FILM COATED ORAL 2 TIMES DAILY
Qty: 14 TABLET | Refills: 0 | Status: SHIPPED | OUTPATIENT
Start: 2023-11-20 | End: 2023-11-27

## 2023-11-20 RX ORDER — PREDNISONE 20 MG/1
20 TABLET ORAL DAILY
Qty: 5 TABLET | Refills: 0 | Status: SHIPPED | OUTPATIENT
Start: 2023-11-20 | End: 2023-11-25

## 2023-11-20 NOTE — PROGRESS NOTES
82 y.o. male patient presents for evaluation of sinus pressure. Patient reports 1 week of progressively worsening maxillary sinus pain, nasal congestion, and headache. There is reported mild postnasal drip and ear pressure. No fever, cough, or other constitutional signs and symptoms. Symptoms have been refractory to over-the-counter medications.      Vitals:    23 0956   BP: 126/69   Pulse: 100   Resp: 18   Temp: 36.9 °C (98.4 °F)       Allergies   Allergen Reactions    Doxycycline Other    Sulfamethoxazole-Trimethoprim Other and Unknown     Nausea and vomiting       Medication Documentation Review Audit       Reviewed by Jacquie Ascencio MA (Medical Assistant) on 23 at 0956      Medication Order Taking? Sig Documenting Provider Last Dose Status   albuterol 90 mcg/actuation inhaler [id number] Yes Inhale 1-2 puffs every 4 hours if needed for wheezing or shortness of breath. Florecita Saldivar APRN-CNP Taking Active   aspirin 81 mg chewable tablet [id number] Yes Chew 1 tablet (81 mg) once daily. Historical Provider, MD Taking Active   citalopram (CeleXA) 20 mg tablet [id number]  Take 1 tablet (20 mg) by mouth once daily. Florecita Carlisle DO   23 2359   dilTIAZem CD (Cardizem CD) 180 mg 24 hr capsule [id number] Yes Take 1 capsule (180 mg) by mouth once daily. Florecita Carlisle DO Taking Active   docusate sodium (Colace) 100 mg capsule [id number] Yes Take 1 capsule (100 mg) by mouth in the morning and 1 capsule (100 mg) before bedtime. Florecita Carlisle DO Taking Active   fluticasone (Flonase) 50 mcg/actuation nasal spray [id number] Yes Administer 2 sprays into each nostril once daily. Historical Provider, MD Taking Active   gabapentin (Neurontin) 100 mg capsule [id number] Yes Take 1 capsule (100 mg) by mouth once daily at bedtime. Historical Provider, MD Taking Active   L. acidophilus/Bifid. animalis 32 billion cell capsule [id number] Yes Take by mouth. Historical Provider, MD Taking Active    Lactobacillus acidophilus (PROBIOTIC ORAL) [id number] Yes Take by mouth. Historical Provider, MD Taking Active   metoprolol tartrate (Lopressor) 25 mg tablet [id number] Yes Take 1 tablet (25 mg) by mouth 2 times a day. Florecita Carlisle DO Taking Active   montelukast (Singulair) 10 mg tablet [id number] Yes Take 1 tablet (10 mg) by mouth once daily at bedtime. Historical Provider, MD Taking Active   nystatin-triamcinolone (Mycolog II) cream [id number] Yes Apply topically 2 times a day. Apply to affect area twice a day for 7-14 days then as needed for rash. Florecita Carlisle DO Taking Active   pantoprazole (ProtoNix) 40 mg EC tablet [id number] Yes Take 1 tablet (40 mg) by mouth 2 times a day. Florecita Carlisle DO Taking Active   tamsulosin (Flomax) 0.4 mg 24 hr capsule [id number] Yes Take by mouth. Historical Provider, MD Taking Active   venlafaxine XR (Effexor-XR) 75 mg 24 hr capsule [id number] Yes Take 1 capsule (75 mg) by mouth once daily. Take with food. Florecita Carlisle DO Taking Active   warfarin (Coumadin) 2 mg tablet [id number] Yes take 1 tablet by mouth once daily or as directed BY INR Florecita Carlisle,  Taking Active                    Past Medical History:   Diagnosis Date    Laceration without foreign body of pharynx and cervical esophagus, initial encounter     Tear of esophagus    Personal history of diseases of the skin and subcutaneous tissue 10/15/2019    History of pressure injury of skin    Personal history of other diseases of the circulatory system     H/O supraventricular tachycardia    Personal history of other diseases of the digestive system     History of hematemesis    Personal history of other diseases of the digestive system     History of Cabello's esophagus    Personal history of pneumonia (recurrent)     History of pneumonia       Past Surgical History:   Procedure Laterality Date    OTHER SURGICAL HISTORY  06/05/2019    Paraesophageal hiatal hernia repair       ROS  See  HPI    Physical Exam  Vitals and nursing note reviewed.   Constitutional:       General: He is not in acute distress.     Appearance: Normal appearance. He is not ill-appearing, toxic-appearing or diaphoretic.   HENT:      Head: Normocephalic and atraumatic.      Right Ear: Tympanic membrane, ear canal and external ear normal.      Left Ear: Tympanic membrane, ear canal and external ear normal.      Nose: Congestion (maxillary sinus tenderness bilaterally, R>L) present.      Mouth/Throat:      Mouth: Mucous membranes are moist.      Pharynx: Oropharynx is clear.   Eyes:      Extraocular Movements: Extraocular movements intact.      Conjunctiva/sclera: Conjunctivae normal.      Pupils: Pupils are equal, round, and reactive to light.   Cardiovascular:      Rate and Rhythm: Normal rate and regular rhythm.      Pulses: Normal pulses.      Heart sounds: Normal heart sounds.   Pulmonary:      Effort: Pulmonary effort is normal.      Breath sounds: Normal breath sounds.   Lymphadenopathy:      Cervical: No cervical adenopathy.   Skin:     General: Skin is warm.      Capillary Refill: Capillary refill takes less than 2 seconds.   Neurological:      General: No focal deficit present.      Mental Status: He is alert and oriented to person, place, and time.   Psychiatric:         Mood and Affect: Mood normal.         Behavior: Behavior normal.         Assessment/Plan/MDM  Evangelina was seen today for uri.  Diagnoses and all orders for this visit:  Acute non-recurrent maxillary sinusitis (Primary)  -     amoxicillin (Amoxil) 875 mg tablet; Take 1 tablet (875 mg) by mouth 2 times a day for 7 days.  -     predniSONE (Deltasone) 20 mg tablet; Take 1 tablet (20 mg) by mouth once daily for 5 days.    Pt requesting prednisone as he reports this usually helps him feel better quicker. Agreeable to sending low dose burst.  Patient's clinical presentation is otherwise unremarkable at this time. Patient is discharged with instructions to  follow-up with primary care or seek emergency medical attention for worsening symptoms or any new concerns.    Atilio Spence, CNP  [facility]  [phone]

## 2023-11-21 ENCOUNTER — ANTICOAGULATION - WARFARIN VISIT (OUTPATIENT)
Dept: PHARMACY | Facility: HOSPITAL | Age: 83
End: 2023-11-21
Payer: MEDICAID

## 2023-11-21 NOTE — PROGRESS NOTES
Called patient regarding new prescriptions of Amoxicillin 875 mg twice daily for 7 days and prednisone 20 mg once daily for 5 days that were to start on 11/20/23    Patient did not answer. Left voicemail for him to call back    Jolynn Crabtree, PharmD

## 2023-12-01 ENCOUNTER — ANTICOAGULATION - WARFARIN VISIT (OUTPATIENT)
Dept: PHARMACY | Facility: HOSPITAL | Age: 83
End: 2023-12-01
Payer: COMMERCIAL

## 2023-12-01 DIAGNOSIS — I48.91 ATRIAL FIBRILLATION, UNSPECIFIED TYPE (MULTI): Primary | ICD-10-CM

## 2023-12-01 LAB
POC INR: 3.4
POC PROTHROMBIN TIME: NORMAL

## 2023-12-01 PROCEDURE — 85610 PROTHROMBIN TIME: CPT

## 2023-12-01 PROCEDURE — 99211 OFF/OP EST MAY X REQ PHY/QHP: CPT | Mod: 25 | Performed by: PHARMACIST

## 2023-12-01 NOTE — PROGRESS NOTES
Pt presents to anticoag clinic for 4 week INR check.  Last INR 2.1 on warfarin 10 mg weekly.  No changes were made at that time.    INR today is 3.4.    Today, pt denies missed doses, diet changes, no OTC/herbal supplement changes, CP/SOB, fatigue, bleeding or bruising since last visit. Pt notes he was just on steroids and prednisone for 5 days for a sinus infection.  Pt is feeling better.  Dose verified.    We reviewed and reinforced steady diet and signs and symptoms of VTE. Pt will be vigilant to any increase in severe bruising or bleeding and instructed to call clinic with any questions, concerns, changes, or bleed prior to next visit.    Plan:  Please hold  x 1 then continue with current warfarin dose.  INR in 2 weeks.  Maintain consistent vegetable intake.  Continue monitoring for any troubling bruising or bleeding and call with any medication changes or concerns.    Pt handout given with above information

## 2023-12-12 ENCOUNTER — OFFICE VISIT (OUTPATIENT)
Dept: PRIMARY CARE | Facility: CLINIC | Age: 83
End: 2023-12-12
Payer: MEDICARE

## 2023-12-12 VITALS
SYSTOLIC BLOOD PRESSURE: 94 MMHG | HEART RATE: 76 BPM | WEIGHT: 121 LBS | DIASTOLIC BLOOD PRESSURE: 55 MMHG | HEIGHT: 67 IN | BODY MASS INDEX: 18.99 KG/M2

## 2023-12-12 DIAGNOSIS — I48.11 LONGSTANDING PERSISTENT ATRIAL FIBRILLATION (MULTI): ICD-10-CM

## 2023-12-12 DIAGNOSIS — E46 PROTEIN-CALORIE MALNUTRITION, UNSPECIFIED SEVERITY (MULTI): Primary | ICD-10-CM

## 2023-12-12 DIAGNOSIS — F32.A MILD DEPRESSION: ICD-10-CM

## 2023-12-12 PROCEDURE — 1159F MED LIST DOCD IN RCRD: CPT | Performed by: INTERNAL MEDICINE

## 2023-12-12 PROCEDURE — 1160F RVW MEDS BY RX/DR IN RCRD: CPT | Performed by: INTERNAL MEDICINE

## 2023-12-12 PROCEDURE — 99213 OFFICE O/P EST LOW 20 MIN: CPT | Performed by: INTERNAL MEDICINE

## 2023-12-12 PROCEDURE — 3078F DIAST BP <80 MM HG: CPT | Performed by: INTERNAL MEDICINE

## 2023-12-12 PROCEDURE — 1126F AMNT PAIN NOTED NONE PRSNT: CPT | Performed by: INTERNAL MEDICINE

## 2023-12-12 PROCEDURE — 1036F TOBACCO NON-USER: CPT | Performed by: INTERNAL MEDICINE

## 2023-12-12 PROCEDURE — 3074F SYST BP LT 130 MM HG: CPT | Performed by: INTERNAL MEDICINE

## 2023-12-12 RX ORDER — DILTIAZEM HYDROCHLORIDE 180 MG/1
180 CAPSULE, COATED, EXTENDED RELEASE ORAL DAILY
Qty: 90 CAPSULE | Refills: 2 | Status: SHIPPED | OUTPATIENT
Start: 2023-12-12

## 2023-12-12 ASSESSMENT — ENCOUNTER SYMPTOMS: NERVOUS/ANXIOUS: 1

## 2023-12-12 NOTE — PROGRESS NOTES
"Subjective   Patient ID: Evangelina Braxton is a 82 y.o. male who presents for Follow-up (2 month).  HPI  Patient is here today for 2 mo follow up   Last appt we changed his Celexa to Effexor to 75mg po daily, he denies any side effects but does not think it has helped.   Pt was seeing a counselor at the [facility].     Review of Systems   Psychiatric/Behavioral:  The patient is nervous/anxious.        Objective   BP 94/55   Pulse 76   Ht 1.702 m (5' 7\")   Wt 54.9 kg (121 lb)   BMI 18.95 kg/m²     Physical Exam  Constitutional:       Appearance: Normal appearance.   Neurological:      Mental Status: He is alert.   Psychiatric:         Mood and Affect: Mood normal.         Behavior: Behavior normal.         Thought Content: Thought content normal.           Assessment/Plan   Problem List Items Addressed This Visit       Atrial fibrillation (CMS/HCC)    Relevant Medications    dilTIAZem CD (Cardizem CD) 180 mg 24 hr capsule    Mild depression    Protein-calorie malnutrition, unspecified severity (CMS/HCC) - Primary    Depression, no change    - no improvement with sertraline, effexor, or celexa   - continue counseling    - I think a lot of it is isolation and loneliness , he says that he is staying busy, doing bus trips and seeing grandkids, has had no improvement with three agents now so will DC      2. Weight stable   - encouraged him to keep multiple small meals a day and keep protein intake up     Final diagnoses:   [E46] Protein-calorie malnutrition, unspecified severity (CMS/HCC)   [I48.11] Longstanding persistent atrial fibrillation (CMS/HCC)   [F32.A] Mild depression     "

## 2023-12-15 ENCOUNTER — ANTICOAGULATION - WARFARIN VISIT (OUTPATIENT)
Dept: PHARMACY | Facility: HOSPITAL | Age: 83
End: 2023-12-15
Payer: MEDICARE

## 2023-12-15 DIAGNOSIS — I48.20 CHRONIC ATRIAL FIBRILLATION (MULTI): Primary | ICD-10-CM

## 2023-12-15 LAB
POC INR: 3.6
POC PROTHROMBIN TIME: NORMAL

## 2023-12-15 PROCEDURE — 85610 PROTHROMBIN TIME: CPT | Mod: QW

## 2023-12-15 PROCEDURE — 99211 OFF/OP EST MAY X REQ PHY/QHP: CPT | Mod: 25 | Performed by: PHARMACIST

## 2023-12-15 NOTE — PROGRESS NOTES
This is a 2 week follow up.    Last INR 3.4 on warfarin 10 mg weekly. He was to hold warfarin that day, then continue with no changes at that time.    Today, pt denies missed doses, medication/diet changes, no OTC/herbal supplement changes, CP/SOB, fatigue, bleeding or bruising since last visit. Dose verified.    We reviewed and reinforced steady diet and signs and symptoms of VTE. Pt will be vigilant to any increase in severe bruising or bleeding and instructed to call clinic with any questions, concerns, changes, or bleed prior to next visit.    Plan:  Hold x1, then continue with current warfarin dose.  INR in 2 weeks.  Maintain consistent vegetable intake.  Continue monitoring for any troubling bruising or bleeding and call with any medication changes or concerns.    Pt handout given with above information

## 2023-12-29 ENCOUNTER — ANTICOAGULATION - WARFARIN VISIT (OUTPATIENT)
Dept: PHARMACY | Facility: HOSPITAL | Age: 83
End: 2023-12-29
Payer: MEDICARE

## 2023-12-29 DIAGNOSIS — I48.20 CHRONIC ATRIAL FIBRILLATION (MULTI): Primary | ICD-10-CM

## 2023-12-29 LAB
POC INR: 1.9
POC PROTHROMBIN TIME: NORMAL

## 2023-12-29 PROCEDURE — 85610 PROTHROMBIN TIME: CPT | Mod: QW

## 2023-12-29 PROCEDURE — 99211 OFF/OP EST MAY X REQ PHY/QHP: CPT | Mod: 25

## 2023-12-29 NOTE — PROGRESS NOTES
Pt presents to [facility] clinic for  management of afib   Current INR of 1.9 is slightly subtherapeutic for goal range of 2-3. It was collected after 2 weeks and is reflective of 10 mg total weekly dose    Patient reports 0 missed doses    Patient denies any medication changes, diet changes, or OTC/herbal supplement changes since last visit.  Patient denies any CP/SOB, fatigue, bleeding or bruising since last visit.   Patient denies any change in alcohol or tobacco use since last visit.   Patient denies any upcoming medical or dental procedures.    Plan:  Patient was instructed to continue current regimen of 2 mg every Sun, Tue, Thu; 1 mg all other day   INR follow up will occur in 3 weeks.  Patient was instructed to maintain consistent vegetable intake, to monitor for any bruising or bleeding, and to call with any medication changes or concerns.    Pt handout given with above information    Jolynn Crabtree, PharmD

## 2024-01-19 ENCOUNTER — APPOINTMENT (OUTPATIENT)
Dept: PHARMACY | Facility: HOSPITAL | Age: 84
End: 2024-01-19
Payer: MEDICARE

## 2024-01-24 ENCOUNTER — ANTICOAGULATION - WARFARIN VISIT (OUTPATIENT)
Dept: PHARMACY | Facility: HOSPITAL | Age: 84
End: 2024-01-24
Payer: COMMERCIAL

## 2024-01-24 DIAGNOSIS — I48.20 CHRONIC ATRIAL FIBRILLATION (MULTI): Primary | ICD-10-CM

## 2024-01-24 LAB
POC INR: 3
POC PROTHROMBIN TIME: NORMAL

## 2024-01-24 PROCEDURE — 85610 PROTHROMBIN TIME: CPT | Mod: QW

## 2024-01-24 PROCEDURE — 99211 OFF/OP EST MAY X REQ PHY/QHP: CPT | Mod: 25 | Performed by: PHARMACIST

## 2024-01-24 NOTE — PROGRESS NOTES
This is a 4 week follow up.    Last INR 1.9 on warfarin 10 mg weekly. No changes were made at that time.     Today, pt denies missed doses, medication/diet changes, no OTC/herbal supplement changes, CP/SOB, fatigue, bleeding or bruising since last visit. Dose verified.    We reviewed and reinforced steady diet and signs and symptoms of VTE. Pt will be vigilant to any increase in severe bruising or bleeding and instructed to call clinic with any questions, concerns, changes, or bleed prior to next visit.    Plan:  Continue with current warfarin dose.  INR in 4 weeks.  Maintain consistent vegetable intake.  Continue monitoring for any troubling bruising or bleeding and call with any medication changes or concerns.    Pt handout given with above information

## 2024-02-06 DIAGNOSIS — I48.11 LONGSTANDING PERSISTENT ATRIAL FIBRILLATION (MULTI): ICD-10-CM

## 2024-02-06 RX ORDER — PANTOPRAZOLE SODIUM 40 MG/1
40 TABLET, DELAYED RELEASE ORAL 2 TIMES DAILY
Qty: 180 TABLET | Refills: 1 | Status: SHIPPED | OUTPATIENT
Start: 2024-02-06

## 2024-02-13 DIAGNOSIS — J44.9 OBSTRUCTIVE LUNG DISEASE (MULTI): Primary | ICD-10-CM

## 2024-02-13 RX ORDER — MONTELUKAST SODIUM 10 MG/1
10 TABLET ORAL NIGHTLY
Qty: 90 TABLET | Refills: 1 | Status: SHIPPED | OUTPATIENT
Start: 2024-02-13

## 2024-02-21 ENCOUNTER — ANTICOAGULATION - WARFARIN VISIT (OUTPATIENT)
Dept: PHARMACY | Facility: HOSPITAL | Age: 84
End: 2024-02-21
Payer: MEDICARE

## 2024-02-21 DIAGNOSIS — I48.20 CHRONIC ATRIAL FIBRILLATION (MULTI): Primary | ICD-10-CM

## 2024-02-21 LAB
POC INR: 2.7
POC PROTHROMBIN TIME: NORMAL

## 2024-02-21 PROCEDURE — 85610 PROTHROMBIN TIME: CPT | Mod: QW

## 2024-02-21 PROCEDURE — 99211 OFF/OP EST MAY X REQ PHY/QHP: CPT | Performed by: PHARMACIST

## 2024-02-21 NOTE — PROGRESS NOTES
Pt enrolled in [facility] for management of Chronic atrial fibrillation (CMS/[facility]) [I48.20].     Pt current location in clinic.     Current anticoagulant: Warfarin    Weeks since last visit: 4    Last INR: 3.0 on warfarin 10 mg in the previous week. No changes were made at that time.  Last Creatinine:   Lab Results   Component Value Date    CREATININE 0.83 04/02/2023     Last hemoglobin/hematocrit:  Lab Results   Component Value Date    HGB 12.2 (L) 04/02/2023     Lab Results   Component Value Date    HCT 38.9 (L) 04/02/2023       Current INR: 2.7 is therapeutic for goal range of 2.0-3.0 and is reflective of 10 mg in the previous week prior to visit.    Patient denies any missed doses.  Patient denies any medication changes, diet changes, or OTC/herbal supplement changes since last visit.  Patient denies any adverse reactions or barriers  Patient denies any CP/SOB, fatigue, bleeding or bruising since last visit.   Patient denies any change in alcohol or tobacco use since last visit.   Patient denies any upcoming medical or dental procedures.    Plan:  Patient was instructed to continue with current warfarin dose  INR follow up will occur in 4 weeks.  Patient was instructed to maintain consistent vegetable intake, to monitor for any bruising or bleeding, and to call with any medication changes or concerns.    Pt handout given with above information    Augie Biswas, AdiliaD

## 2024-02-26 DIAGNOSIS — I48.11 LONGSTANDING PERSISTENT ATRIAL FIBRILLATION (MULTI): Primary | ICD-10-CM

## 2024-03-08 DIAGNOSIS — N20.0 KIDNEY STONE: Primary | ICD-10-CM

## 2024-03-09 ENCOUNTER — OFFICE VISIT (OUTPATIENT)
Dept: URGENT CARE | Facility: CLINIC | Age: 84
End: 2024-03-09
Payer: MEDICARE

## 2024-03-09 VITALS
HEART RATE: 98 BPM | TEMPERATURE: 97.9 F | SYSTOLIC BLOOD PRESSURE: 115 MMHG | OXYGEN SATURATION: 93 % | DIASTOLIC BLOOD PRESSURE: 60 MMHG | HEIGHT: 67 IN | WEIGHT: 121.03 LBS | BODY MASS INDEX: 19 KG/M2

## 2024-03-09 DIAGNOSIS — K52.9 ENTERITIS: Primary | ICD-10-CM

## 2024-03-09 PROCEDURE — 99213 OFFICE O/P EST LOW 20 MIN: CPT | Performed by: PHYSICIAN ASSISTANT

## 2024-03-09 PROCEDURE — 2500000005 HC RX 250 GENERAL PHARMACY W/O HCPCS: Performed by: PHYSICIAN ASSISTANT

## 2024-03-09 RX ORDER — ONDANSETRON 4 MG/1
4 TABLET, ORALLY DISINTEGRATING ORAL EVERY 8 HOURS PRN
Qty: 6 TABLET | Refills: 0 | Status: SHIPPED | OUTPATIENT
Start: 2024-03-09 | End: 2024-03-12

## 2024-03-09 RX ORDER — ONDANSETRON 4 MG/1
4 TABLET, ORALLY DISINTEGRATING ORAL ONCE
Status: COMPLETED | OUTPATIENT
Start: 2024-03-09 | End: 2024-03-09

## 2024-03-09 RX ADMIN — ONDANSETRON 4 MG: 4 TABLET, ORALLY DISINTEGRATING ORAL at 11:21

## 2024-03-09 NOTE — PROGRESS NOTES
[facility] [place] NOTE:      Name: Evangelina Braxton, 83 y.o.    CSN:[id number]   MRN:[id number]    PCP: Florecita Carlisle, DO    ALL:    Allergies   Allergen Reactions    Doxycycline Other    Sulfamethoxazole-Trimethoprim Other and Unknown     Nausea and vomiting       History:    Chief Complaint: Abdominal Pain (Abdominal pain after eating a fish sandwich yesterday, has had some abdominal bloating, fullness and a sensation of wanting to vomit. )    Encounter Date: 3/9/2024  11:00hrs    HPI: The history was obtained from the patient. Evangelina is a 83 y.o. male, who presents with a chief complaint of Abdominal Pain (Abdominal pain after eating a fish sandwich yesterday, has had some abdominal bloating, fullness and a sensation of wanting to vomit. )     He ate a fish sandwich from [employer] yesterday. Symptoms have been truly radiated over lateralized, he is got some diffuse periumbilical discomfort, denies any vomiting or hematic emesis.  States that he had a bowel movement this morning that was small in quantity and fullness.    Has a history of atrial fibrillation, is adherent to his anticoagulants, denies any abdominal surgery except years ago having an inguinal hernia repair.    He has been trying to gain weight, and has been eating more, states he may have ate too fast and this is may be contributing to his symptoms.  He is not aware of any other cause.    PMHx:    Past Medical History:   Diagnosis Date    Laceration without foreign body of pharynx and cervical esophagus, initial encounter     Tear of esophagus    Personal history of diseases of the skin and subcutaneous tissue 10/15/2019    History of pressure injury of skin    Personal history of other diseases of the circulatory system     H/O supraventricular tachycardia    Personal history of other diseases of the digestive system     History of hematemesis    Personal history of other diseases of the digestive system     History of  Cabello's esophagus    Personal history of pneumonia (recurrent)     History of pneumonia              Current Outpatient Medications   Medication Sig Dispense Refill    albuterol 90 mcg/actuation inhaler Inhale 1-2 puffs every 4 hours if needed for wheezing or shortness of breath. 8.5 g 0    aspirin 81 mg chewable tablet Chew 1 tablet (81 mg) once daily.      dilTIAZem CD (Cardizem CD) 180 mg 24 hr capsule Take 1 capsule (180 mg) by mouth once daily. 90 capsule 2    docusate sodium (Colace) 100 mg capsule Take 1 capsule (100 mg) by mouth in the morning and 1 capsule (100 mg) before bedtime. 60 capsule 2    fluticasone (Flonase) 50 mcg/actuation nasal spray Administer 2 sprays into each nostril once daily.      gabapentin (Neurontin) 100 mg capsule Take 1 capsule (100 mg) by mouth once daily at bedtime.      Lactobacillus acidophilus (PROBIOTIC ORAL) Take by mouth.      metoprolol tartrate (Lopressor) 25 mg tablet Take 1 tablet (25 mg) by mouth 2 times a day. 180 tablet 3    montelukast (Singulair) 10 mg tablet take 1 tablet by mouth every evening 90 tablet 1    nystatin-triamcinolone (Mycolog II) cream Apply topically 2 times a day. Apply to affect area twice a day for 7-14 days then as needed for rash. 15 g 1    ondansetron ODT (Zofran-ODT) 4 mg disintegrating tablet Take 1 tablet (4 mg) by mouth every 8 hours if needed for nausea or vomiting for up to 3 days. 6 tablet 0    pantoprazole (ProtoNix) 40 mg EC tablet Take 1 tablet (40 mg) by mouth 2 times a day. 180 tablet 1    tamsulosin (Flomax) 0.4 mg 24 hr capsule Take by mouth.      warfarin (Coumadin) 2 mg tablet take 1 tablet by mouth once daily or as directed BY INR 90 tablet 1     Current Facility-Administered Medications   Medication Dose Route Frequency Provider Last Rate Last Admin    ondansetron ODT (Zofran-ODT) disintegrating tablet 4 mg  4 mg oral Once Talon Zacarias PA-C             PMSx:    Past Surgical History:   Procedure Laterality Date     OTHER SURGICAL HISTORY  06/05/2019    Paraesophageal hiatal hernia repair       Fam Hx:   Family History   Problem Relation Name Age of Onset    Hypertension Mother      Other (cardiac disorder) Father      Hypertension Sister      Stroke Brother      Prostate cancer Brother      Stroke Other grandparent        SOC. Hx:     Social History     Socioeconomic History    Marital status:      Spouse name: Not on file    Number of children: Not on file    Years of education: Not on file    Highest education level: Not on file   Occupational History    Not on file   Tobacco Use    Smoking status: Never    Smokeless tobacco: Never   Vaping Use    Vaping Use: Never used   Substance and Sexual Activity    Alcohol use: Not Currently    Drug use: Never    Sexual activity: Not Currently   Other Topics Concern    Not on file   Social History Narrative    Not on file     Social Determinants of Health     Financial Resource Strain: Not on file   Food Insecurity: Not on file   Transportation Needs: Not on file   Physical Activity: Not on file   Stress: Not on file   Social Connections: Not on file   Intimate Partner Violence: Not on file   Housing Stability: Not on file         Vitals:    03/09/24 1119   BP: 115/60   Pulse: 98   Temp: 36.6 °C (97.9 °F)   SpO2: 93%     54.9 kg (121 lb 0.5 oz)          Physical Exam  Constitutional:       Appearance: Normal appearance. He is underweight.   HENT:      Head: Normocephalic and atraumatic.      Right Ear: Hearing normal.      Left Ear: Hearing normal.      Nose: Nose normal.      Mouth/Throat:      Lips: Pink.      Mouth: Mucous membranes are moist.   Eyes:      General: No scleral icterus.     Extraocular Movements: Extraocular movements intact.   Cardiovascular:      Rate and Rhythm: Normal rate and regular rhythm.   Pulmonary:      Effort: Pulmonary effort is normal.      Breath sounds: Normal breath sounds.   Abdominal:      General: Abdomen is flat. Bowel sounds are normal.       Palpations: Abdomen is soft. There is no shifting dullness, fluid wave, hepatomegaly, splenomegaly, mass or pulsatile mass.      Tenderness: There is generalized abdominal tenderness and tenderness in the periumbilical area. There is no right CVA tenderness or left CVA tenderness. Negative signs include Aguilar's sign, Rovsing's sign, McBurney's sign and psoas sign.      Hernia: No hernia is present.      Comments: Negative heel strike.   Musculoskeletal:         General: Normal range of motion.      Cervical back: Normal range of motion and neck supple.   Skin:     General: Skin is warm.      Coloration: Skin is not jaundiced.   Neurological:      Mental Status: He is alert and oriented to person, place, and time.   Psychiatric:         Behavior: Behavior normal.            COURSE/MEDICAL DECISION MAKING:    Evangelina is a 83 y.o., who presents with a working diagnosis of   1. Enteritis     with a differential to include: Influenza, parainfluenza, rhinovirus, adenovirus, metapneumovirus, coronavirus, COVID-19, postnasal drip, strep pharyngitis, GERD, retropharyngeal abscess, tonsillitis, adenitis, seasonal allergies, norovirus, adenovirus, astrovirus    Provided Zofran 4 mg in the office ODT, sent with the prescription, if symptoms or not improving by early afternoon he is encouraged to seek higher level of care with imaging can be pursued to rule out any additional underlying cause of findings. He was agreeable with plan.  Updated daughter here with patient.        Talon Zacarias PA-C   Advanced Practice Provider  [facility] URGENT [facility]ARE

## 2024-03-20 ENCOUNTER — ANTICOAGULATION - WARFARIN VISIT (OUTPATIENT)
Dept: PHARMACY | Facility: HOSPITAL | Age: 84
End: 2024-03-20
Payer: MEDICARE

## 2024-03-20 DIAGNOSIS — I48.11 LONGSTANDING PERSISTENT ATRIAL FIBRILLATION (MULTI): ICD-10-CM

## 2024-03-20 LAB
INR IN PPP BY COAGULATION ASSAY EXTERNAL: 2.8
PROTHROMBIN TIME (PT) IN PPP BY COAGULATION ASSAY EXTERNAL: NORMAL SECONDS

## 2024-03-20 PROCEDURE — 99211 OFF/OP EST MAY X REQ PHY/QHP: CPT | Performed by: PHARMACIST

## 2024-03-20 NOTE — PROGRESS NOTES
Pt enrolled in [facility] for management of I48.11     Pt current location in clinic.     Current anticoagulant: Warfarin    Time since last visit: 4 weeks    Last INR: 2.7 on warfarin 10 mg in the previous week. No changes were made at that time.    Last Creatinine:   Lab Results   Component Value Date    CREATININE 0.83 04/02/2023     Last hemoglobin/hematocrit:  Lab Results   Component Value Date    HGB 12.2 (L) 04/02/2023     Lab Results   Component Value Date    HCT 38.9 (L) 04/02/2023       Current INR: 2.8 is therapeutic for goal range of 2.0-3.0 and is reflective of 10 mg in the previous week prior to visit.      Patient denies any missed doses.  Patient may start taking VitD supplement this week  Pt has some trouble swallowing, sees Dr. Martinez at [place]. Food doesn't taste good to him anymore. Still manages to eat once or twice per day  Patient denies any adverse reactions or barriers.  Patient cut himself with scissors a couple days ago, states bleeding was bad at first, but resolved after 5 minutes.   Patient denies any change in alcohol or tobacco use since last visit.   Patient denies any upcoming medical or dental procedures.    Pt will have ultrasound scheduled at some point for Dr. Zhou for possible kidney stones. Date TBD    Plan:  Patient was instructed to continue with current warfarin dose.  INR follow up will occur in 4 weeks.  Patient was instructed to maintain consistent vegetable intake, to monitor for any bruising or bleeding, and to call with any medication changes or concerns.    Pt handout given with above information    CARL PRESSLEY I was present with the pharmacy student who participated in the documentation of this note. I have personally evaluated the patient and performed the decision-making components (assessment and plan of care). I have reviewed the pharmacy student documentation and verified the findings in the note as written with additions or exceptions as stated in  the body of this note.    Augie Biswas, AdiliaD

## 2024-04-04 DIAGNOSIS — I48.11 LONGSTANDING PERSISTENT ATRIAL FIBRILLATION (MULTI): ICD-10-CM

## 2024-04-04 RX ORDER — WARFARIN 2 MG/1
TABLET ORAL
Qty: 90 TABLET | Refills: 1 | Status: SHIPPED | OUTPATIENT
Start: 2024-04-04

## 2024-04-08 ENCOUNTER — APPOINTMENT (OUTPATIENT)
Dept: RADIOLOGY | Facility: HOSPITAL | Age: 84
End: 2024-04-08
Payer: MEDICARE

## 2024-04-09 ENCOUNTER — APPOINTMENT (OUTPATIENT)
Dept: PRIMARY CARE | Facility: CLINIC | Age: 84
End: 2024-04-09

## 2024-04-10 ENCOUNTER — HOSPITAL ENCOUNTER (OUTPATIENT)
Dept: RADIOLOGY | Facility: HOSPITAL | Age: 84
Discharge: HOME | End: 2024-04-10
Payer: MEDICARE

## 2024-04-10 DIAGNOSIS — N20.0 KIDNEY STONE: ICD-10-CM

## 2024-04-10 PROCEDURE — 76770 US EXAM ABDO BACK WALL COMP: CPT

## 2024-04-10 PROCEDURE — 76770 US EXAM ABDO BACK WALL COMP: CPT | Performed by: STUDENT IN AN ORGANIZED HEALTH CARE EDUCATION/TRAINING PROGRAM

## 2024-04-13 ENCOUNTER — OFFICE VISIT (OUTPATIENT)
Dept: URGENT CARE | Facility: CLINIC | Age: 84
End: 2024-04-13
Payer: MEDICAID

## 2024-04-13 VITALS
DIASTOLIC BLOOD PRESSURE: 52 MMHG | BODY MASS INDEX: 19.3 KG/M2 | SYSTOLIC BLOOD PRESSURE: 89 MMHG | OXYGEN SATURATION: 90 % | WEIGHT: 123 LBS | HEIGHT: 67 IN | HEART RATE: 82 BPM | RESPIRATION RATE: 16 BRPM | TEMPERATURE: 98.3 F

## 2024-04-13 DIAGNOSIS — J01.90 ACUTE SINUSITIS, RECURRENCE NOT SPECIFIED, UNSPECIFIED LOCATION: Primary | ICD-10-CM

## 2024-04-13 PROCEDURE — 99213 OFFICE O/P EST LOW 20 MIN: CPT | Performed by: NURSE PRACTITIONER

## 2024-04-13 RX ORDER — AMOXICILLIN 400 MG/5ML
880 POWDER, FOR SUSPENSION ORAL 2 TIMES DAILY
Qty: 230 ML | Refills: 0 | Status: SHIPPED | OUTPATIENT
Start: 2024-04-13 | End: 2024-04-24 | Stop reason: WASHOUT

## 2024-04-13 NOTE — PROGRESS NOTES
[facility] URGENT [facility]ARE  Florecita Saldivar, APRN-CNP     Visit Note - 4/13/2024 9:56 AM   This note was generated with voice recognition software and may contain errors including spelling, grammar, syntax, and misrecognization of what was dictated.    Patient: Evangelina Braxton, MRN: [id number], 83 y.o., male   PCP: Daniella Hamlin MD  ------------------------------------  ALLERGIES:   Allergies   Allergen Reactions    Doxycycline Other    Sulfamethoxazole-Trimethoprim Other and Unknown     Nausea and vomiting        CURRENT MEDICATIONS:   Current Outpatient Medications   Medication Instructions    albuterol 90 mcg/actuation inhaler 1-2 puffs, inhalation, Every 4 hours PRN    amoxicillin (AMOXIL) 880 mg, oral, 2 times daily, Take with a meal.    aspirin 81 mg, oral, Daily    dilTIAZem CD (CARDIZEM CD) 180 mg, oral, Daily    docusate sodium (COLACE) 100 mg, oral, 2 times daily    fluticasone (Flonase) 50 mcg/actuation nasal spray 2 sprays, Each Nostril, Daily    gabapentin (NEURONTIN) 100 mg, oral, Nightly    Lactobacillus acidophilus (PROBIOTIC ORAL) oral    metoprolol tartrate (LOPRESSOR) 25 mg, oral, 2 times daily    montelukast (SINGULAIR) 10 mg, oral, Nightly    nystatin-triamcinolone (Mycolog II) cream Topical, 2 times daily, Apply to affect area twice a day for 7-14 days then as needed for rash.    pantoprazole (PROTONIX) 40 mg, oral, 2 times daily    tamsulosin (Flomax) 0.4 mg 24 hr capsule oral    warfarin (Coumadin) 2 mg tablet take 1 tablet by mouth once daily or as directed BY INR     ------------------------------------  PAST MEDICAL HX:  Patient Active Problem List   Diagnosis    Acute URI    Allergic otitis media of left ear    Allergies    Anemia, chronic disease    Aortic regurgitation, congenital (HHS-HCC)    Aortic stenosis, moderate    Atrial fibrillation (Multi)    Back pain    Chest pain    Benign hypertension    Bilateral pleural effusion    Blurry vision    BPH (benign prostatic  hyperplasia)    BPPV (benign paroxysmal positional vertigo)    Bursitis    Carotid artery stenosis    Obstructive lung disease (Multi)    Diarrhea    Dizziness    Dysphagia    GERD (gastroesophageal reflux disease)    Hiatal hernia    Hyperlipidemia    Impacted cerumen of left ear    Lower extremity edema    Mild depression    Obstructive sleep apnea    Sacral pressure sore    Seasonal allergies    Severe persistent asthma (Multi)    Skin lesion    Spondylosis of cervical region without myelopathy or radiculopathy    Unintentional weight loss    Ganglion cyst    Other constipation    Hospital discharge follow-up    Hypoxemia    Glaucoma    Headache    Heartburn    Hydronephrosis    Erectile dysfunction    COPD exacerbation (Multi)    Calculus of kidney    Aortic valve sclerosis    Acute on chronic respiratory failure with hypoxemia (Multi)    Asthma (HHS-HCC)    Ingrowing nail    Onychomycosis    Tinea unguium    Pneumonia    Varicose veins of left lower extremity    SVT (supraventricular tachycardia) (CMS-HCC)    Arthritis    Protein-calorie malnutrition, unspecified severity (Multi)    Longstanding persistent atrial fibrillation (Multi)      SURGICAL HX:  Past Surgical History:   Procedure Laterality Date    OTHER SURGICAL HISTORY  06/05/2019    Paraesophageal hiatal hernia repair      FAMILY HX:   No pertinent history.   SOCIAL HX:    reports that he has never smoked. He has never used smokeless tobacco.  ------------------------------------  CHIEF COMPLAINT:   Chief Complaint   Patient presents with    URI     Cough, sinus drainage and sore throat x 2 weeks      HISTORY OF PRESENT ILLNESS: The history was obtained from patient. Evangelina is a 83 y.o. male, who presents with a chief complaint of sore throat, runny nose (clear mucus), and a productive cough (clear phlegm) - sxs started ~2 weeks ago. Denies any fever/chills, body aches, ear pain, headaches, abdominal pain, chest pain, wheezing/shortness of breath,  "rashes, urinary symptoms, nausea/vomiting, and diarrhea. Denies any lightheadedness or dizziness; no changes in mental status. No swelling in legs. Appetite is decreased ; is able to eat and drink fluids without difficulty; denies loss of sense of taste or smell. Reports symptoms have gotten worse since onset. Has been taking  Tylenol  without much relief; no other over-the-counter medications or home remedies for symptom management. No known ill contacts. Has received the COVID vaccine x 2; Has not received this season's influenza vaccine. No known history of COVID infection.  Is not a smoker.  Has history of COPD/asthma - denies any s/sxs of current flare, and has not needed to use his albuterol inhaler. No recent antibiotic use. Currently on Coumadin - reports INRs have been stable - has them checked once monthly - no recent medication adjustments. Due for next INR check in 4/17/24.      REVIEW OF SYSTEMS:  10 systems reviewed negative with exception of history of present illness as listed above.    TODAY'S VITALS: BP 89/52   Pulse 82   Temp 36.8 °C (98.3 °F)   Resp 16   Ht 1.7 m (5' 6.93\")   Wt 55.8 kg (123 lb)   SpO2 90%   BMI 19.31 kg/m²   Recheck BP: 91/55, recheck SpO2: 93%.     PHYSICAL EXAMINATION:  General:  Mildly ill-appearing, pleasant, elderly male; alert and oriented; in no acute distress. Sitting comfortably on exam table. Non-dyspneic.   Eyes:  Pupils equal, round and reactive to light. No conjunctival erythema; no scleral icterus.   HENT: Mild frontal and maxillary sinus tenderness; + audible nasal congestion. Airway patent, TMs and ear canals clear/unremarkable bilaterally. Nasal mucosa mildly injected and edematous. Oral mucosa moist. Posterior pharynx mildly injected but without vesicles or oropharyngeal exudate aside from PND. Uvula is midline. Managing oral secretions without difficulty.  Neck:  Supple. Mildly tender, mobile anterior cervical lymphadenopathy bilat. Trachea is " midline.  Respiratory:  Respirations easy and unlabored, Breath sounds equal. Lungs are clear to auscultation; no wheezes, rhonchi, or rales; has good air movement throughout. + non-productive cough noted. Non-dyspneic with ambulation; able to maintain SpO2.  Cardiovascular:  Normal rate, Regular rhythm. Normal S1S2. No m/r/g. No peripheral edema.   Gastrointestinal:  Soft, non-tender, non-distended; no palpable masses or organomegaly. Bowel sounds normoactive.  Musculoskeletal:  Grossly normal; appropriate for age.     Integumentary:  Pink, warm, dry, and intact. No rashes or skin discoloration appreciated. Good skin turgor.  Neurologic:  Alert and oriented, no gross deficits.    Cognition and Speech:  Oriented, Speech clear and coherent.    Psychiatric:  Cooperative, Appropriate mood & affect.       ------------------------------------  Medical Decision Making  LABORATORY or RADIOLOGICAL IMAGING ORDERS/RESULTS:   None    IMPRESSION/PLAN:  Course: Worsening; stable    1. Acute sinusitis, recurrence not specified, unspecified location  - amoxicillin (Amoxil) 400 mg/5 mL suspension; Take 11 mL (880 mg) by mouth 2 times a day for 10 days. Take with a meal.  Dispense: 230 mL; Refill: 0    Hypotensive and has low SpO2 on exam today, but patient reports these are both baseline and insists he is asymptomatic. Denies any s/sxs of asthma/COPD flare. Reviewed past visit notes and appears he does tend to have lower SpO2 and Bps, although advised needs to contact PCP ASAP to discuss whether or not he should hold today's BP medication- he agrees to call PCP after today's visit to discuss. No other red flags on exam today. Symptoms consistent with sinusitis with associated pharyngitis, although reviewed other potential etiologies. Due to severity/duration of sxs, will begin treatment for bacterial infection today (Amoxicillin - liquid, per pt's request). Should finish full course of antibiotics, even if symptoms resolve more  quickly. Instructed to push fluids, rest, and to use appropriate over the counter medications as needed for management of symptoms - discussed that Mucinex, vaporizer, salt water gargles, and Saline Nasal Spray may be helpful.  Reviewed red flags to monitor for, counseled on potential adverse reactions of treatments, expectations for improvement in sxs, and advised to follow-up with primary care provider in 3-5 days if symptoms persist, or sooner if worsening or if any additional concerns/red flags develop. Should also follow up with PCP ASAP re: hypotension. Also needs to contact coumadin clinic ASAP to discuss INR recheck in light of antibiotic treatment. Patient verbalized understanding and agreed with plan of care; questions were encouraged and answered.       KRISTINA Zimmerman-CNP   Advanced Practice Provider  [facility] URGENT CARE

## 2024-04-15 ENCOUNTER — TELEPHONE (OUTPATIENT)
Dept: PRIMARY CARE | Facility: CLINIC | Age: 84
End: 2024-04-15
Payer: COMMERCIAL

## 2024-04-15 NOTE — TELEPHONE ENCOUNTER
Clearly explained to patient a few times to stop his Metoprolol until Thursday; take his BP at pharmacy since he does not have BP cuff at home and let us know what his BP is running. Patient seemed very confused as to what I was trying to explain to him.

## 2024-04-15 NOTE — TELEPHONE ENCOUNTER
Pt was seen at Urgent Care on 4/13/24 for a URI, pt had a BP of 89/52. Pt was told to follow up with his PCP due to the medication he is on. Pt does have a new pt visit with Dr. Daniella Hamlin on 4/24/24 pt wanted to know if he should cut back on his meds?  Please advise.

## 2024-04-17 ENCOUNTER — ANTICOAGULATION - WARFARIN VISIT (OUTPATIENT)
Dept: PHARMACY | Facility: HOSPITAL | Age: 84
End: 2024-04-17
Payer: MEDICARE

## 2024-04-17 DIAGNOSIS — I48.11 LONGSTANDING PERSISTENT ATRIAL FIBRILLATION (MULTI): Primary | ICD-10-CM

## 2024-04-17 LAB
POC INR: 2.4
POC PROTHROMBIN TIME: NORMAL

## 2024-04-17 PROCEDURE — 85610 PROTHROMBIN TIME: CPT

## 2024-04-17 PROCEDURE — 99211 OFF/OP EST MAY X REQ PHY/QHP: CPT

## 2024-04-17 NOTE — PROGRESS NOTES
Pt enrolled in [facility] for management of Longstanding persistent atrial fibrillation (Multi) [I48.11].     Pt current location in clinic.     Current anticoagulant: Warfarin    Time since last visit: 4 weeks    Last INR: 2.8 on warfarin 10 mg in the previous week. No changes were made at that time.    Last Creatinine:   Lab Results   Component Value Date    CREATININE 0.83 04/02/2023     Last hemoglobin/hematocrit:  Lab Results   Component Value Date    HGB 12.2 (L) 04/02/2023     Lab Results   Component Value Date    HCT 38.9 (L) 04/02/2023       Current INR: 2.4 is therapeutic for goal range of 2.0-3.0 and is reflective of 10 mg in the previous week prior to visit.    Patient denies any missed doses.  Patient denies any medication changes, diet changes, or OTC/herbal supplement changes since last visit.  Patient is currently on amoxicillin 400 mg/ 5 mL suspension. He is to take for 10 days. Patient started on Saturday (04/13/2024).  Patient denies any adverse reactions or barriers.  Patient denies any CP/SOB, fatigue, bleeding or bruising since last visit.   Patient denies any change in alcohol or tobacco use since last visit.   Patient denies any upcoming medical or dental procedures.    Plan:  Patient was instructed to continue with current warfarin dose of 2 mg every Sun, Tue, Thu; 1 mg all other days   INR follow up will occur in 4 weeks.  Patient was instructed to maintain consistent vegetable intake, to monitor for any bruising or bleeding, and to call with any medication changes or concerns.    Pt handout given with above information    Jolynn Crabtree, AdiliaD

## 2024-04-18 ENCOUNTER — TELEPHONE (OUTPATIENT)
Dept: PRIMARY CARE | Facility: CLINIC | Age: 84
End: 2024-04-18
Payer: COMMERCIAL

## 2024-04-18 NOTE — TELEPHONE ENCOUNTER
Patient brought in written BP log; however it will not scan due to the log being on navy blue paper.    BP log 04/18/24:    118/58 HR 87  138/70 HR 77  136/68 HR 92    Dr. Carlisle stated BP looks good and to continue holding the Metoprolol and only take 0.5 tablet if BP increased; continue taking Cardizem daily

## 2024-04-19 ENCOUNTER — APPOINTMENT (OUTPATIENT)
Dept: PRIMARY CARE | Facility: CLINIC | Age: 84
End: 2024-04-19
Payer: COMMERCIAL

## 2024-04-23 ENCOUNTER — APPOINTMENT (OUTPATIENT)
Dept: PRIMARY CARE | Facility: CLINIC | Age: 84
End: 2024-04-23
Payer: COMMERCIAL

## 2024-04-24 ENCOUNTER — OFFICE VISIT (OUTPATIENT)
Dept: PRIMARY CARE | Facility: CLINIC | Age: 84
End: 2024-04-24
Payer: COMMERCIAL

## 2024-04-24 ENCOUNTER — LAB (OUTPATIENT)
Dept: LAB | Facility: LAB | Age: 84
End: 2024-04-24
Payer: COMMERCIAL

## 2024-04-24 VITALS
HEART RATE: 84 BPM | HEIGHT: 67 IN | OXYGEN SATURATION: 94 % | WEIGHT: 117 LBS | SYSTOLIC BLOOD PRESSURE: 110 MMHG | BODY MASS INDEX: 18.36 KG/M2 | DIASTOLIC BLOOD PRESSURE: 60 MMHG

## 2024-04-24 DIAGNOSIS — R09.82 ALLERGIC RHINITIS WITH POSTNASAL DRIP: ICD-10-CM

## 2024-04-24 DIAGNOSIS — F33.9 DEPRESSION, RECURRENT (CMS-HCC): ICD-10-CM

## 2024-04-24 DIAGNOSIS — J30.9 ALLERGIC RHINITIS WITH POSTNASAL DRIP: ICD-10-CM

## 2024-04-24 DIAGNOSIS — R63.4 UNINTENTIONAL WEIGHT LOSS: ICD-10-CM

## 2024-04-24 DIAGNOSIS — E46 PROTEIN-CALORIE MALNUTRITION, UNSPECIFIED SEVERITY (MULTI): ICD-10-CM

## 2024-04-24 DIAGNOSIS — I48.11 LONGSTANDING PERSISTENT ATRIAL FIBRILLATION (MULTI): ICD-10-CM

## 2024-04-24 DIAGNOSIS — R35.0 BENIGN PROSTATIC HYPERPLASIA WITH URINARY FREQUENCY: Primary | ICD-10-CM

## 2024-04-24 DIAGNOSIS — N40.1 BENIGN PROSTATIC HYPERPLASIA WITH URINARY FREQUENCY: Primary | ICD-10-CM

## 2024-04-24 PROBLEM — R42 DIZZINESS: Status: RESOLVED | Noted: 2023-04-06 | Resolved: 2024-04-24

## 2024-04-24 PROBLEM — H65.92: Status: RESOLVED | Noted: 2023-04-06 | Resolved: 2024-04-24

## 2024-04-24 PROBLEM — H81.10 BPPV (BENIGN PAROXYSMAL POSITIONAL VERTIGO): Status: RESOLVED | Noted: 2023-04-06 | Resolved: 2024-04-24

## 2024-04-24 PROBLEM — R51.9 HEADACHE: Status: RESOLVED | Noted: 2023-09-18 | Resolved: 2024-04-24

## 2024-04-24 PROBLEM — J96.21 ACUTE ON CHRONIC RESPIRATORY FAILURE WITH HYPOXEMIA (MULTI): Status: RESOLVED | Noted: 2023-09-18 | Resolved: 2024-04-24

## 2024-04-24 PROBLEM — L98.9 SKIN LESION: Status: RESOLVED | Noted: 2023-04-06 | Resolved: 2024-04-24

## 2024-04-24 PROBLEM — J18.9 PNEUMONIA: Status: RESOLVED | Noted: 2023-09-18 | Resolved: 2024-04-24

## 2024-04-24 PROBLEM — J06.9 ACUTE URI: Status: RESOLVED | Noted: 2023-04-06 | Resolved: 2024-04-24

## 2024-04-24 PROBLEM — K59.09 OTHER CONSTIPATION: Status: RESOLVED | Noted: 2023-04-06 | Resolved: 2024-04-24

## 2024-04-24 PROBLEM — R09.02 HYPOXEMIA: Status: RESOLVED | Noted: 2023-09-18 | Resolved: 2024-04-24

## 2024-04-24 PROBLEM — Z09 HOSPITAL DISCHARGE FOLLOW-UP: Status: RESOLVED | Noted: 2023-04-06 | Resolved: 2024-04-24

## 2024-04-24 PROBLEM — R19.7 DIARRHEA: Status: RESOLVED | Noted: 2023-04-06 | Resolved: 2024-04-24

## 2024-04-24 PROBLEM — H61.22 IMPACTED CERUMEN OF LEFT EAR: Status: RESOLVED | Noted: 2023-04-06 | Resolved: 2024-04-24

## 2024-04-24 PROBLEM — J44.1 COPD EXACERBATION (MULTI): Status: RESOLVED | Noted: 2023-09-18 | Resolved: 2024-04-24

## 2024-04-24 LAB
ALBUMIN SERPL BCP-MCNC: 4.1 G/DL (ref 3.4–5)
ALP SERPL-CCNC: 47 U/L (ref 33–136)
ALT SERPL W P-5'-P-CCNC: 8 U/L (ref 10–52)
ANION GAP SERPL CALC-SCNC: 10 MMOL/L (ref 10–20)
AST SERPL W P-5'-P-CCNC: 14 U/L (ref 9–39)
BILIRUB SERPL-MCNC: 0.9 MG/DL (ref 0–1.2)
BUN SERPL-MCNC: 15 MG/DL (ref 6–23)
CALCIUM SERPL-MCNC: 9.3 MG/DL (ref 8.6–10.3)
CAOX CRY #/AREA UR COMP ASSIST: NORMAL /HPF
CHLORIDE SERPL-SCNC: 106 MMOL/L (ref 98–107)
CO2 SERPL-SCNC: 28 MMOL/L (ref 21–32)
CREAT SERPL-MCNC: 0.98 MG/DL (ref 0.5–1.3)
EGFRCR SERPLBLD CKD-EPI 2021: 76 ML/MIN/1.73M*2
ERYTHROCYTE [DISTWIDTH] IN BLOOD BY AUTOMATED COUNT: 12.9 % (ref 11.5–14.5)
ERYTHROCYTE [SEDIMENTATION RATE] IN BLOOD BY WESTERGREN METHOD: 4 MM/H (ref 0–20)
GLUCOSE SERPL-MCNC: 123 MG/DL (ref 74–99)
HCT VFR BLD AUTO: 44.5 % (ref 41–52)
HGB BLD-MCNC: 13.7 G/DL (ref 13.5–17.5)
MCH RBC QN AUTO: 28.5 PG (ref 26–34)
MCHC RBC AUTO-ENTMCNC: 30.8 G/DL (ref 32–36)
MCV RBC AUTO: 93 FL (ref 80–100)
MUCOUS THREADS #/AREA URNS AUTO: NORMAL /LPF
NRBC BLD-RTO: 0 /100 WBCS (ref 0–0)
PLATELET # BLD AUTO: 336 X10*3/UL (ref 150–450)
POTASSIUM SERPL-SCNC: 4.3 MMOL/L (ref 3.5–5.3)
PROT SERPL-MCNC: 6.5 G/DL (ref 6.4–8.2)
RBC # BLD AUTO: 4.81 X10*6/UL (ref 4.5–5.9)
RBC #/AREA URNS AUTO: NORMAL /HPF
SODIUM SERPL-SCNC: 140 MMOL/L (ref 136–145)
TSH SERPL-ACNC: 1.73 MIU/L (ref 0.44–3.98)
WBC # BLD AUTO: 6.3 X10*3/UL (ref 4.4–11.3)
WBC #/AREA URNS AUTO: NORMAL /HPF

## 2024-04-24 PROCEDURE — 80053 COMPREHEN METABOLIC PANEL: CPT

## 2024-04-24 PROCEDURE — 3078F DIAST BP <80 MM HG: CPT | Performed by: STUDENT IN AN ORGANIZED HEALTH CARE EDUCATION/TRAINING PROGRAM

## 2024-04-24 PROCEDURE — 84443 ASSAY THYROID STIM HORMONE: CPT

## 2024-04-24 PROCEDURE — 1036F TOBACCO NON-USER: CPT | Performed by: STUDENT IN AN ORGANIZED HEALTH CARE EDUCATION/TRAINING PROGRAM

## 2024-04-24 PROCEDURE — 1157F ADVNC CARE PLAN IN RCRD: CPT | Performed by: STUDENT IN AN ORGANIZED HEALTH CARE EDUCATION/TRAINING PROGRAM

## 2024-04-24 PROCEDURE — 36415 COLL VENOUS BLD VENIPUNCTURE: CPT

## 2024-04-24 PROCEDURE — 81001 URINALYSIS AUTO W/SCOPE: CPT

## 2024-04-24 PROCEDURE — 85652 RBC SED RATE AUTOMATED: CPT

## 2024-04-24 PROCEDURE — 1159F MED LIST DOCD IN RCRD: CPT | Performed by: STUDENT IN AN ORGANIZED HEALTH CARE EDUCATION/TRAINING PROGRAM

## 2024-04-24 PROCEDURE — 85027 COMPLETE CBC AUTOMATED: CPT

## 2024-04-24 PROCEDURE — 3074F SYST BP LT 130 MM HG: CPT | Performed by: STUDENT IN AN ORGANIZED HEALTH CARE EDUCATION/TRAINING PROGRAM

## 2024-04-24 PROCEDURE — 1160F RVW MEDS BY RX/DR IN RCRD: CPT | Performed by: STUDENT IN AN ORGANIZED HEALTH CARE EDUCATION/TRAINING PROGRAM

## 2024-04-24 PROCEDURE — 99214 OFFICE O/P EST MOD 30 MIN: CPT | Performed by: STUDENT IN AN ORGANIZED HEALTH CARE EDUCATION/TRAINING PROGRAM

## 2024-04-24 RX ORDER — FLUTICASONE PROPIONATE 50 MCG
2 SPRAY, SUSPENSION (ML) NASAL DAILY
Qty: 16 G | Refills: 2 | Status: SHIPPED | OUTPATIENT
Start: 2024-04-24

## 2024-04-24 RX ORDER — METOPROLOL TARTRATE 25 MG/1
25 TABLET, FILM COATED ORAL 2 TIMES DAILY
Qty: 180 TABLET | Refills: 3 | Status: SHIPPED | OUTPATIENT
Start: 2024-04-24

## 2024-04-24 RX ORDER — TAMSULOSIN HYDROCHLORIDE 0.4 MG/1
0.4 CAPSULE ORAL DAILY
Qty: 90 CAPSULE | Refills: 3 | Status: SHIPPED | OUTPATIENT
Start: 2024-04-24

## 2024-04-24 ASSESSMENT — PATIENT HEALTH QUESTIONNAIRE - PHQ9
8. MOVING OR SPEAKING SO SLOWLY THAT OTHER PEOPLE COULD HAVE NOTICED. OR THE OPPOSITE, BEING SO FIGETY OR RESTLESS THAT YOU HAVE BEEN MOVING AROUND A LOT MORE THAN USUAL: NOT AT ALL
5. POOR APPETITE OR OVEREATING: SEVERAL DAYS
6. FEELING BAD ABOUT YOURSELF - OR THAT YOU ARE A FAILURE OR HAVE LET YOURSELF OR YOUR FAMILY DOWN: NOT AT ALL
4. FEELING TIRED OR HAVING LITTLE ENERGY: NOT AT ALL
9. THOUGHTS THAT YOU WOULD BE BETTER OFF DEAD, OR OF HURTING YOURSELF: NOT AT ALL
1. LITTLE INTEREST OR PLEASURE IN DOING THINGS: NOT AT ALL
2. FEELING DOWN, DEPRESSED OR HOPELESS: NEARLY EVERY DAY
10. IF YOU CHECKED OFF ANY PROBLEMS, HOW DIFFICULT HAVE THESE PROBLEMS MADE IT FOR YOU TO DO YOUR WORK, TAKE CARE OF THINGS AT HOME, OR GET ALONG WITH OTHER PEOPLE: NOT DIFFICULT AT ALL
7. TROUBLE CONCENTRATING ON THINGS, SUCH AS READING THE NEWSPAPER OR WATCHING TELEVISION: NOT AT ALL
SUM OF ALL RESPONSES TO PHQ QUESTIONS 1-9: 7
3. TROUBLE FALLING OR STAYING ASLEEP OR SLEEPING TOO MUCH: NEARLY EVERY DAY
SUM OF ALL RESPONSES TO PHQ9 QUESTIONS 1 AND 2: 3

## 2024-04-24 NOTE — PROGRESS NOTES
Subjective:  Evangelina Braxton is a 83 y.o. male who presents to clinic today for Establish Care      He follows with cardiology at [facility], he is to see them in 1 year.   He has high cohlesterol and hypertension    He is losing weight, he notes this is due to not eating, he doesn't have an appetite, he lives by himself, food no longer tastes good,     He notes that he is having difficulty with swallowing and has a history of GERD  A few years ago he had an esophageal stricture dilated  He notes that he thinks his stricture is back  He is regurgitating food a lot  He saw Dr. Martinez at [facility] for this    PHQ9 - 7  Review of Systems    Assessment/Plan:  Evangelina Braxton is a 83 y.o. male with a history of GERD, anemia, aortic stenosis, BPH, dysphagia who presents to clinic today to address the following issues:   1. Benign prostatic hyperplasia with urinary frequency  tamsulosin (Flomax) 0.4 mg 24 hr capsule      2. Longstanding persistent atrial fibrillation (Multi)  metoprolol tartrate (Lopressor) 25 mg tablet      3. Unintentional weight loss  CBC    Comprehensive Metabolic Panel    Fecal Occult Blood Immunoassy    Thyroid Stimulating Hormone    Microscopic Only, Urine    Sedimentation Rate    XR chest 2 views      4. Allergic rhinitis with postnasal drip  fluticasone (Flonase) 50 mcg/actuation nasal spray      5. Depression, recurrent (CMS-HCC)        6. Protein-calorie malnutrition, unspecified severity (Multi)        A fib:  Continue Cardizem  Continue warfarin through the Coumadin clinic  Continue metoprolol, refill provided today    Unintentional weight loss:  Patient notes that he continues to lose significant weight including 4 to 6 pounds over the last month.  He has not been enjoying eating foods as well as having difficulty swallowing and a sore throat.  He is going to contact his surgeon who did his prior EGD and dilation for an appointment and the information for this was given.   "Additionally I am doing a workup for unintentional weight loss as above.  He does have signs of allergic rhinitis on examination and I explained to him that using Flonase may help with some of his symptoms so a prescription for this was given.  -We discussed different food resources in the community as well as the importance of not losing further weight because it does increase his risk of fragility fractures and other significant causes  -Consider DEXA scan in the future    Problem List Items Addressed This Visit       BPH (benign prostatic hyperplasia) - Primary    Current Assessment & Plan     Refill Flomax per patient request         Relevant Medications    tamsulosin (Flomax) 0.4 mg 24 hr capsule    Unintentional weight loss    Relevant Orders    CBC    Comprehensive Metabolic Panel    Fecal Occult Blood Immunoassy    Thyroid Stimulating Hormone    Microscopic Only, Urine    Sedimentation Rate    XR chest 2 views    Protein-calorie malnutrition, unspecified severity (Multi)    Longstanding persistent atrial fibrillation (Multi)    Relevant Medications    metoprolol tartrate (Lopressor) 25 mg tablet    Depression, recurrent (CMS-HCC)     Other Visit Diagnoses       Allergic rhinitis with postnasal drip        Relevant Medications    fluticasone (Flonase) 50 mcg/actuation nasal spray            There are no Patient Instructions on file for this visit.    Follow up: 1 month or sooner as needed    Return precautions discussed.  An After Visit Summary was given to the patient.  All questions were answered and patient in agreement with plan.    Objective:  /60   Pulse 84   Ht 1.7 m (5' 6.93\")   Wt 53.1 kg (117 lb)   SpO2 94%   BMI 18.36 kg/m²     Physical Exam  Vitals and nursing note reviewed.   Constitutional:       General: He is not in acute distress.     Appearance: Normal appearance. He is not ill-appearing.   HENT:      Head: Normocephalic and atraumatic.      Nose: Congestion present.      " Comments: Inflammed nasal turbinates     Mouth/Throat:      Mouth: Mucous membranes are moist.      Pharynx: Posterior oropharyngeal erythema present.   Eyes:      General: No scleral icterus.        Right eye: No discharge.         Left eye: No discharge.      Extraocular Movements: Extraocular movements intact.      Conjunctiva/sclera: Conjunctivae normal.      Pupils: Pupils are equal, round, and reactive to light.   Cardiovascular:      Rate and Rhythm: Normal rate and regular rhythm.      Heart sounds: Murmur heard.   Pulmonary:      Effort: Pulmonary effort is normal. No respiratory distress.   Skin:     General: Skin is warm and dry.   Neurological:      General: No focal deficit present.      Mental Status: He is alert and oriented to person, place, and time.      Sensory: No sensory deficit.      Motor: No weakness.         I spent 32 minutes in total time for this visit including all related clinical activities before, during, and after the visit excluding other billable activities/procedure time.     Daniella Hamlin MD

## 2024-04-24 NOTE — ASSESSMENT & PLAN NOTE
Continue Cardizem  Continue warfarin through the Coumadin clinic  Continue metoprolol, refill provided today

## 2024-04-25 ENCOUNTER — TELEPHONE (OUTPATIENT)
Dept: PRIMARY CARE | Facility: CLINIC | Age: 84
End: 2024-04-25
Payer: COMMERCIAL

## 2024-04-25 NOTE — TELEPHONE ENCOUNTER
Gail:    Please call patient to discuss the following lab results.  Thankfully, they all look okay! No changes indicated based on them.    Once finished please close the encounter.    Thank you,  Daniella Hamlin MD

## 2024-04-30 ASSESSMENT — ENCOUNTER SYMPTOMS
DIFFICULTY URINATING: 0
COUGH: 0
SHORTNESS OF BREATH: 0
ENDOCRINE NEGATIVE: 1
NAUSEA: 0
FEVER: 0
EYES NEGATIVE: 1
ALLERGIC/IMMUNOLOGIC NEGATIVE: 1
PSYCHIATRIC NEGATIVE: 1
CHILLS: 0

## 2024-04-30 NOTE — PROGRESS NOTES
Subjective   Patient ID: Evangelina Braxton is a 83 y.o. male.    HPI  Patient is here for 6 month follow with Renal US  for hx of kidney stones. 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 ...CT was done on 3/30/23 which showed a 7mm stone in the distal right ureter with mild prominence of the more proximal right ureter, also nonobstructing stones in the right kidney measuring up to 5mm. No N/V, No F/C.. Chronic BPH sx are mild and stable. Denies urgency and frequency. Denies dysuria. Denies hematuria. Nocturia x1. He is taking Flomax. This is helpful. ED is not an issue. Energy level is good       Review of Systems   Constitutional:  Negative for chills and fever.   HENT: Negative.     Eyes: Negative.    Respiratory:  Negative for cough and shortness of breath.    Cardiovascular:  Negative for chest pain and leg swelling.   Gastrointestinal:  Negative for nausea.   Endocrine: Negative.    Genitourinary:  Negative for difficulty urinating.        Negative except for documented in HPI   Allergic/Immunologic: Negative.    Neurological:         Alert & oriented X 3   Hematological:         Denies blood thinners   Psychiatric/Behavioral: Negative.         Objective   Physical Exam  Vitals and nursing note reviewed.   Constitutional:       General: He is not in acute distress.     Appearance: Normal appearance.   Pulmonary:      Effort: Pulmonary effort is normal.   Abdominal:      Tenderness: There is no abdominal tenderness.   Genitourinary:     Comments: Kidneys non palpable bilaterally  Bladder non palpable or tender  Scrotum no mass, No hydrocele  Epididymis- No spermatocele. Non Tender.  Testicles: No mass. Symmetric  Urethra: No discharge  Penis within normal limits... No lesions. circumcised  Prostate - symmetric, no nodules. BENIGN  Seminal Vesicals: No mass.  Sphincter tone: normal  Neurological:      Mental Status: He is alert.          Assessment/Plan   Diagnoses and all orders for this visit:  Kidney stone  Benign prostatic hyperplasia with urinary frequency  Erectile dysfunction, unspecified erectile dysfunction type    All available PSA values reviewed, Options discussed. Questions answered.   Diet changes for prostate health discussed and educational information given. Pros/Cons of prostate health supplements discussed.   Treatment options for LUTS reviewed  Continue Flomax  Discussed timed voiding. Discussed fluid and caffeine intake  Treatment options for ED reviewed.  Lifestyle change to help prevent UTIs discussed. Encouraged fluid intake.  U/S reviewed  KUB ordered  Stone prevention discussed. Diet reviewed. Discussed fluid intake      F/U  1 year with KUB

## 2024-05-01 ENCOUNTER — OFFICE VISIT (OUTPATIENT)
Dept: UROLOGY | Facility: CLINIC | Age: 84
End: 2024-05-01
Payer: COMMERCIAL

## 2024-05-01 VITALS — RESPIRATION RATE: 16 BRPM | WEIGHT: 116 LBS | BODY MASS INDEX: 18.21 KG/M2

## 2024-05-01 DIAGNOSIS — N20.0 KIDNEY STONE: ICD-10-CM

## 2024-05-01 DIAGNOSIS — R35.0 BENIGN PROSTATIC HYPERPLASIA WITH URINARY FREQUENCY: ICD-10-CM

## 2024-05-01 DIAGNOSIS — N52.9 ERECTILE DYSFUNCTION, UNSPECIFIED ERECTILE DYSFUNCTION TYPE: ICD-10-CM

## 2024-05-01 DIAGNOSIS — N40.1 BENIGN PROSTATIC HYPERPLASIA WITH URINARY FREQUENCY: ICD-10-CM

## 2024-05-01 PROCEDURE — 1157F ADVNC CARE PLAN IN RCRD: CPT | Performed by: UROLOGY

## 2024-05-01 PROCEDURE — 1036F TOBACCO NON-USER: CPT | Performed by: UROLOGY

## 2024-05-01 PROCEDURE — 1160F RVW MEDS BY RX/DR IN RCRD: CPT | Performed by: UROLOGY

## 2024-05-01 PROCEDURE — 1159F MED LIST DOCD IN RCRD: CPT | Performed by: UROLOGY

## 2024-05-01 PROCEDURE — 99214 OFFICE O/P EST MOD 30 MIN: CPT | Performed by: UROLOGY

## 2024-05-15 ENCOUNTER — ANTICOAGULATION - WARFARIN VISIT (OUTPATIENT)
Dept: PHARMACY | Facility: HOSPITAL | Age: 84
End: 2024-05-15
Payer: MEDICARE

## 2024-05-15 DIAGNOSIS — I48.11 LONGSTANDING PERSISTENT ATRIAL FIBRILLATION (MULTI): Primary | ICD-10-CM

## 2024-05-15 LAB
POC INR: 3.2
POC PROTHROMBIN TIME: NORMAL

## 2024-05-15 PROCEDURE — 85610 PROTHROMBIN TIME: CPT | Mod: QW

## 2024-05-15 PROCEDURE — 99211 OFF/OP EST MAY X REQ PHY/QHP: CPT | Performed by: PHARMACIST

## 2024-05-15 NOTE — PROGRESS NOTES
Pt enrolled in [facility] for management of Longstanding persistent atrial fibrillation (Multi) [I48.11].     Pt current location in clinic.     Current anticoagulant: Warfarin    Time since last visit: 4 weeks    Last INR: 2.4 on warfarin 10 mg in the previous week. No changes were made at that time.    Last Creatinine:   Lab Results   Component Value Date    CREATININE 0.98 04/24/2024     Last hemoglobin/hematocrit:  Lab Results   Component Value Date    HGB 13.7 04/24/2024     Lab Results   Component Value Date    HCT 44.5 04/24/2024       Current INR: 3.2 is SUPRAtherapeutic, likely due to recent antibiotic use for goal range of 2.0-3.0 and is reflective of 10 mg in the previous week prior to visit.    Patient denies any missed doses.  Patient denies any medication changes, diet changes, or OTC/herbal supplement changes since last visit.  Pt notes he recently finished an antibiotic (amoxicillin).    Patient denies any adverse reactions or barriers.  Patient denies any CP/SOB, fatigue, bleeding or bruising since last visit.   Patient denies any change in alcohol or tobacco use since last visit.   Patient denies any upcoming medical or dental procedures.    Plan:  Patient was instructed to hold x 1 then continue with current warfarin dose.  INR follow up will occur in 4 weeks.  Patient was instructed to maintain consistent vegetable intake, to monitor for any bruising or bleeding, and to call with any medication changes or concerns.    Pt handout given with above information    Kerry Spence, AdiliaD

## 2024-05-23 ENCOUNTER — APPOINTMENT (OUTPATIENT)
Dept: PRIMARY CARE | Facility: CLINIC | Age: 84
End: 2024-05-23
Payer: COMMERCIAL

## 2024-05-25 ENCOUNTER — TELEPHONE (OUTPATIENT)
Dept: URGENT CARE | Facility: CLINIC | Age: 84
End: 2024-05-25

## 2024-05-25 ENCOUNTER — OFFICE VISIT (OUTPATIENT)
Dept: URGENT CARE | Facility: CLINIC | Age: 84
End: 2024-05-25
Payer: COMMERCIAL

## 2024-05-25 VITALS
TEMPERATURE: 98.1 F | BODY MASS INDEX: 18.36 KG/M2 | OXYGEN SATURATION: 93 % | DIASTOLIC BLOOD PRESSURE: 65 MMHG | SYSTOLIC BLOOD PRESSURE: 127 MMHG | HEIGHT: 67 IN | HEART RATE: 94 BPM | RESPIRATION RATE: 16 BRPM | WEIGHT: 117 LBS

## 2024-05-25 DIAGNOSIS — J02.9 ACUTE PHARYNGITIS, UNSPECIFIED ETIOLOGY: ICD-10-CM

## 2024-05-25 DIAGNOSIS — J06.9 ACUTE UPPER RESPIRATORY INFECTION: Primary | ICD-10-CM

## 2024-05-25 LAB — POC GROUP A STREP, PCR: NOT DETECTED

## 2024-05-25 PROCEDURE — 87651 STREP A DNA AMP PROBE: CPT | Mod: QW | Performed by: NURSE PRACTITIONER

## 2024-05-25 PROCEDURE — 99213 OFFICE O/P EST LOW 20 MIN: CPT | Performed by: NURSE PRACTITIONER

## 2024-05-25 RX ORDER — LORATADINE 10 MG/1
10 TABLET ORAL DAILY
Qty: 14 TABLET | Refills: 0 | Status: SHIPPED | OUTPATIENT
Start: 2024-05-25

## 2024-05-25 NOTE — PROGRESS NOTES
[facility]  Florecita Saldivar, APRN-CNP     Visit Note - 5/25/2024 10:12 AM   This note was generated with voice recognition software and may contain errors including spelling, grammar, syntax, and misrecognization of what was dictated.    Patient: Evangelina Braxton, MRN: [id number], 83 y.o., male   PCP: Daniella Hamlin MD  ------------------------------------  ALLERGIES:   Allergies   Allergen Reactions    Doxycycline Other    Sulfamethoxazole-Trimethoprim Other and Unknown     Nausea and vomiting        CURRENT MEDICATIONS:   Current Outpatient Medications   Medication Instructions    aspirin 81 mg, oral, Daily    dilTIAZem CD (CARDIZEM CD) 180 mg, oral, Daily    fluticasone (Flonase) 50 mcg/actuation nasal spray 2 sprays, Each Nostril, Daily    gabapentin (NEURONTIN) 100 mg, oral, Nightly    Lactobacillus acidophilus (PROBIOTIC ORAL) oral    loratadine (CLARITIN) 10 mg, oral, Daily, For allergies    metoprolol tartrate (LOPRESSOR) 25 mg, oral, 2 times daily    montelukast (SINGULAIR) 10 mg, oral, Nightly    pantoprazole (PROTONIX) 40 mg, oral, 2 times daily    tamsulosin (FLOMAX) 0.4 mg, oral, Daily    warfarin (Coumadin) 2 mg tablet take 1 tablet by mouth once daily or as directed BY INR     ------------------------------------  PAST MEDICAL HX:  Patient Active Problem List   Diagnosis    Allergies    Anemia, chronic disease    Aortic regurgitation, congenital (HHS-HCC)    Aortic stenosis, moderate    Atrial fibrillation (Multi)    Back pain    Chest pain    Benign hypertension    Bilateral pleural effusion    Blurry vision    BPH (benign prostatic hyperplasia)    Bursitis    Carotid artery stenosis    Obstructive lung disease (Multi)    Dysphagia    GERD (gastroesophageal reflux disease)    Hiatal hernia    Hyperlipidemia    Lower extremity edema    Mild depression    Obstructive sleep apnea    Sacral pressure sore    Seasonal allergies    Severe persistent asthma (Multi)    Spondylosis of  cervical region without myelopathy or radiculopathy    Unintentional weight loss    Ganglion cyst    Glaucoma    Heartburn    Hydronephrosis    Erectile dysfunction    Calculus of kidney    Aortic valve sclerosis    Asthma (HHS-HCC)    Ingrowing nail    Onychomycosis    Tinea unguium    Varicose veins of left lower extremity    SVT (supraventricular tachycardia) (CMS-HCC)    Arthritis    Protein-calorie malnutrition, unspecified severity (Multi)    Longstanding persistent atrial fibrillation (Multi)    Depression, recurrent (CMS-HCC)      SURGICAL HX:  Past Surgical History:   Procedure Laterality Date    OTHER SURGICAL HISTORY  06/05/2019    Paraesophageal hiatal hernia repair      FAMILY HX:   No pertinent history.   SOCIAL HX:    reports that he has never smoked. He has never used smokeless tobacco.  ------------------------------------  CHIEF COMPLAINT:   Chief Complaint   Patient presents with    URI     Cough, sinus drainage, watery eyes, sore throat x 3 days       HISTORY OF PRESENT ILLNESS: The history was obtained from patient. Evangelina is a 83 y.o. male, who presents with a chief complaint of watery eyes, nasal congestion (clear mucus), sore throat, headache, nausea that he attributes to his PND, chills, and a productive cough (clear phlegm) that he attributes to a tickle in his throat - sxs started 2-3 days ago. Reports his ears have also been popping, and he has slightly loose stools. Denies any discoloration of stool. Denies any fevers, new body aches, abdominal pain, chest pain, wheezing/shortness of breath, rashes, urinary symptoms, and vomiting. Denies any lightheadedness or dizziness; no changes in mental status. No swelling in legs. Appetite is decreased ; is able to eat and drink fluids without difficulty; denies loss of sense of taste or smell. Reports symptoms have gotten worse since onset. Has been taking  Tylenol  without much relief; no other over-the-counter medications or home remedies for  "symptom management. No known ill contacts. Has received the COVID vaccine x 2; Has received this season's influenza vaccine. No known history of COVID infection.  Is not a smoker.  Has history of COPD - does not feel symptoms are currently flared. Was on Amoxicillin on 4/14/24 for similar sxs - reports these fully resolved after course of antibiotics.      REVIEW OF SYSTEMS:  10 systems reviewed negative with exception of history of present illness as listed above.    TODAY'S VITALS: /65 (BP Location: Right arm, Patient Position: Sitting, BP Cuff Size: Large adult)   Pulse 94   Temp 36.7 °C (98.1 °F) (Temporal)   Resp 16   Ht 1.702 m (5' 7\")   Wt 53.1 kg (117 lb)   SpO2 93%   BMI 18.32 kg/m²     PHYSICAL EXAMINATION:  General:  Mildly ill-appearing, thin, elderly male; alert and oriented; in no acute distress. Sitting comfortably on exam table. Non-dyspneic.   Eyes:  Pupils equal, round and reactive to light. No conjunctival erythema; no scleral icterus.   HENT: No frontal or maxillary sinus tenderness; + audible nasal congestion. Airway patent, TMs and ear canals clear/unremarkable bilaterally. Nasal mucosa mildly injected and edematous. Oral mucosa moist. Posterior pharynx mildly injected but without vesicles or oropharyngeal exudate aside from PND. Uvula is midline. Managing oral secretions without difficulty.  Neck:  Supple. No palpable lymphadenopathy bilat. Trachea is midline.  Respiratory:  Respirations easy and unlabored, Breath sounds equal. Lungs are clear to auscultation; no wheezes, rhonchi, or rales; has good air movement throughout. + loose, productive cough noted. Non-dyspneic with ambulation; able to maintain SpO2.  Cardiovascular:  Normal rate, Regular rhythm. Normal S1S2. No m/r/g. No peripheral edema.   Gastrointestinal:  Soft, non-tender, non-distended; no palpable masses or organomegaly. Bowel sounds normoactive.  Musculoskeletal:  Grossly normal; appropriate for age.   "   Integumentary:  Pink, warm, dry, and intact. No rashes or skin discoloration appreciated. Good skin turgor.  Neurologic:  Alert and oriented, no gross deficits.    Cognition and Speech:  Oriented, Speech clear and coherent.    Psychiatric:  Cooperative, Appropriate mood & affect.       ------------------------------------  Medical Decision Making  LABORATORY or RADIOLOGICAL IMAGING ORDERS/RESULTS:   Strep A PCR: results pending.    IMPRESSION/PLAN:  Course: Worsening; stable    1. Acute upper respiratory infection  2. Acute pharyngitis, unspecified etiology  - loratadine (Claritin) 10 mg tablet; Take 1 tablet (10 mg) by mouth once daily. For allergies  Dispense: 14 tablet; Refill: 0  - POCT Group A Streptococcus, PCR manually resulted    No red flags on exam today. SpO2 was low, but per review of previous visit and per pt's report, this appears to be near his baseline, and he denies feeling symptomatic in terms of chest congestion/COPD symptoms. Current symptoms consistent with viral URI with associated symptoms (seasonal allergies could also be contributing), but reviewed other potential etiologies. Strep test done to err on the side of caution - will contact with results once available. No antibiotics indicated at this point, but will start Claritin today. Instructed to push fluids, gradually progress to BRAT diet as able to tolerate, to rest, and to use appropriate over the counter medications as needed for management of symptoms. Advised can review test results on the portal and office will contact pt within the next 24-48 hours. Reviewed instructions for self-isolation and continued monitoring. Reviewed red flags to monitor for, counseled on potential adverse reactions of treatments, expectations for improvement in sxs, and advised to follow-up with primary care provider in 3-5 days if symptoms persist, or to seek care sooner if worsening or if any additional concerns/red flags develop. Reports he has follow  up appt with PCP on Tuesday. Patient agreed with plan of care; questions were encouraged and answered.       KRISTINA Zimmerman-CNP   Advanced Practice Provider  [facility]

## 2024-05-28 ENCOUNTER — OFFICE VISIT (OUTPATIENT)
Dept: PRIMARY CARE | Facility: CLINIC | Age: 84
End: 2024-05-28
Payer: COMMERCIAL

## 2024-05-28 VITALS
DIASTOLIC BLOOD PRESSURE: 58 MMHG | HEIGHT: 67 IN | BODY MASS INDEX: 17.4 KG/M2 | OXYGEN SATURATION: 97 % | HEART RATE: 64 BPM | SYSTOLIC BLOOD PRESSURE: 112 MMHG | WEIGHT: 110.9 LBS

## 2024-05-28 DIAGNOSIS — R63.4 UNINTENTIONAL WEIGHT LOSS: ICD-10-CM

## 2024-05-28 DIAGNOSIS — F33.9 DEPRESSION, RECURRENT (CMS-HCC): ICD-10-CM

## 2024-05-28 DIAGNOSIS — Z23 NEED FOR PROPHYLACTIC VACCINATION AGAINST STREPTOCOCCUS PNEUMONIAE (PNEUMOCOCCUS): ICD-10-CM

## 2024-05-28 DIAGNOSIS — Z00.00 ROUTINE GENERAL MEDICAL EXAMINATION AT HEALTH CARE FACILITY: Primary | ICD-10-CM

## 2024-05-28 DIAGNOSIS — Z71.89 ADVANCED CARE PLANNING/COUNSELING DISCUSSION: ICD-10-CM

## 2024-05-28 PROCEDURE — 1159F MED LIST DOCD IN RCRD: CPT | Performed by: STUDENT IN AN ORGANIZED HEALTH CARE EDUCATION/TRAINING PROGRAM

## 2024-05-28 PROCEDURE — G0439 PPPS, SUBSEQ VISIT: HCPCS | Performed by: STUDENT IN AN ORGANIZED HEALTH CARE EDUCATION/TRAINING PROGRAM

## 2024-05-28 PROCEDURE — 1170F FXNL STATUS ASSESSED: CPT | Performed by: STUDENT IN AN ORGANIZED HEALTH CARE EDUCATION/TRAINING PROGRAM

## 2024-05-28 PROCEDURE — 1157F ADVNC CARE PLAN IN RCRD: CPT | Performed by: STUDENT IN AN ORGANIZED HEALTH CARE EDUCATION/TRAINING PROGRAM

## 2024-05-28 PROCEDURE — 99214 OFFICE O/P EST MOD 30 MIN: CPT | Performed by: STUDENT IN AN ORGANIZED HEALTH CARE EDUCATION/TRAINING PROGRAM

## 2024-05-28 PROCEDURE — G0444 DEPRESSION SCREEN ANNUAL: HCPCS | Performed by: STUDENT IN AN ORGANIZED HEALTH CARE EDUCATION/TRAINING PROGRAM

## 2024-05-28 PROCEDURE — 1160F RVW MEDS BY RX/DR IN RCRD: CPT | Performed by: STUDENT IN AN ORGANIZED HEALTH CARE EDUCATION/TRAINING PROGRAM

## 2024-05-28 PROCEDURE — 3074F SYST BP LT 130 MM HG: CPT | Performed by: STUDENT IN AN ORGANIZED HEALTH CARE EDUCATION/TRAINING PROGRAM

## 2024-05-28 PROCEDURE — G0009 ADMIN PNEUMOCOCCAL VACCINE: HCPCS | Performed by: STUDENT IN AN ORGANIZED HEALTH CARE EDUCATION/TRAINING PROGRAM

## 2024-05-28 PROCEDURE — 3078F DIAST BP <80 MM HG: CPT | Performed by: STUDENT IN AN ORGANIZED HEALTH CARE EDUCATION/TRAINING PROGRAM

## 2024-05-28 PROCEDURE — 1036F TOBACCO NON-USER: CPT | Performed by: STUDENT IN AN ORGANIZED HEALTH CARE EDUCATION/TRAINING PROGRAM

## 2024-05-28 PROCEDURE — 90677 PCV20 VACCINE IM: CPT | Performed by: STUDENT IN AN ORGANIZED HEALTH CARE EDUCATION/TRAINING PROGRAM

## 2024-05-28 RX ORDER — MIRTAZAPINE 7.5 MG/1
7.5 TABLET, FILM COATED ORAL NIGHTLY
Qty: 30 TABLET | Refills: 3 | Status: SHIPPED | OUTPATIENT
Start: 2024-05-28 | End: 2024-11-24

## 2024-05-28 ASSESSMENT — ACTIVITIES OF DAILY LIVING (ADL)
DRESSING: INDEPENDENT
DOING_HOUSEWORK: INDEPENDENT
GROCERY_SHOPPING: INDEPENDENT
BATHING: INDEPENDENT
MANAGING_FINANCES: NEEDS ASSISTANCE
TAKING_MEDICATION: INDEPENDENT

## 2024-05-28 ASSESSMENT — PATIENT HEALTH QUESTIONNAIRE - PHQ9
5. POOR APPETITE OR OVEREATING: NEARLY EVERY DAY
10. IF YOU CHECKED OFF ANY PROBLEMS, HOW DIFFICULT HAVE THESE PROBLEMS MADE IT FOR YOU TO DO YOUR WORK, TAKE CARE OF THINGS AT HOME, OR GET ALONG WITH OTHER PEOPLE: SOMEWHAT DIFFICULT
4. FEELING TIRED OR HAVING LITTLE ENERGY: NOT AT ALL
8. MOVING OR SPEAKING SO SLOWLY THAT OTHER PEOPLE COULD HAVE NOTICED. OR THE OPPOSITE, BEING SO FIGETY OR RESTLESS THAT YOU HAVE BEEN MOVING AROUND A LOT MORE THAN USUAL: NOT AT ALL
SUM OF ALL RESPONSES TO PHQ QUESTIONS 1-9: 10
6. FEELING BAD ABOUT YOURSELF - OR THAT YOU ARE A FAILURE OR HAVE LET YOURSELF OR YOUR FAMILY DOWN: SEVERAL DAYS
1. LITTLE INTEREST OR PLEASURE IN DOING THINGS: NOT AT ALL
3. TROUBLE FALLING OR STAYING ASLEEP OR SLEEPING TOO MUCH: NEARLY EVERY DAY
2. FEELING DOWN, DEPRESSED OR HOPELESS: NEARLY EVERY DAY
7. TROUBLE CONCENTRATING ON THINGS, SUCH AS READING THE NEWSPAPER OR WATCHING TELEVISION: NOT AT ALL
SUM OF ALL RESPONSES TO PHQ9 QUESTIONS 1 AND 2: 3
9. THOUGHTS THAT YOU WOULD BE BETTER OFF DEAD, OR OF HURTING YOURSELF: NOT AT ALL

## 2024-05-28 ASSESSMENT — ENCOUNTER SYMPTOMS
LOSS OF SENSATION IN FEET: 0
DEPRESSION: 1
OCCASIONAL FEELINGS OF UNSTEADINESS: 0

## 2024-05-28 NOTE — PATIENT INSTRUCTIONS
Personal health Plan:  - START mirtazapine 7.5mg daily to help with your mood and appetite  - consider going to therapy, if you call the area  on aging they might be able to help you find one that takes your insurance [phone]  - eat at least 3 meals a day  - we gave you your pneumonia shot today

## 2024-05-28 NOTE — PROGRESS NOTES
"Subjective   Reason for Visit: Evangelina Braxton is an 83 y.o. male here for a Medicare Wellness visit.     Past Medical, Surgical, and Family History reviewed and updated in chart.    Reviewed all medications by prescribing practitioner or clinical pharmacist (such as prescriptions, OTCs, herbal therapies and supplements) and documented in the medical record.        If he were unable to make his own medical decisions he would want his daughter Negin Barbosa to make his decisions. He notes he would not want to have chest compressions or electric shocks. He is not interested in a breathing tube if his breathing worsens. He would not want a feeding tube.     Problem based Visit:  PHQ 9 -10, he notes that he does not have much social interaction and he is lonely, he is not getting out to see friends or family much, he spent the weekend alone ([place] Day).  - he is having difficulty finding motivation to cook, he is not interested in eating much, he doesn't enjoy going out alone  - he feels socially isolated and misses having a partner  - he occasionally sees his daughter, but she is also busy  - he does not have much of an appetite.        Patient Care Team:  Daniella Hamlin MD as PCP - General (Family Medicine)  Florecita Carlisle DO as PCP - [employer] PCP     Review of Systems    Objective   Vitals:  /58   Pulse 64   Ht 1.702 m (5' 7\")   Wt 50.3 kg (110 lb 14.4 oz)   SpO2 97%   BMI 17.37 kg/m²       Physical Exam  Vitals and nursing note reviewed.   Constitutional:       General: He is not in acute distress.     Appearance: He is underweight. He is not ill-appearing.   HENT:      Head: Normocephalic and atraumatic.      Mouth/Throat:      Mouth: Mucous membranes are moist.   Eyes:      General: No scleral icterus.        Right eye: No discharge.         Left eye: No discharge.      Extraocular Movements: Extraocular movements intact.      Conjunctiva/sclera: Conjunctivae normal. "   Pulmonary:      Effort: No respiratory distress.   Skin:     General: Skin is dry.   Neurological:      General: No focal deficit present.      Mental Status: He is alert and oriented to person, place, and time.   Psychiatric:         Mood and Affect: Mood is depressed.         Thought Content: Thought content normal.         Judgment: Judgment normal.         Assessment/Plan   Problem List Items Addressed This Visit       Unintentional weight loss    Relevant Medications    mirtazapine (Remeron) 7.5 mg tablet    Depression, recurrent (CMS-HCC)    Relevant Medications    mirtazapine (Remeron) 7.5 mg tablet     Other Visit Diagnoses       Routine general medical examination at health care facility    -  Primary    Advanced care planning/counseling discussion        Relevant Orders    DNR and No Intubation (Completed)    Need for prophylactic vaccination against Streptococcus pneumoniae (pneumococcus)        Relevant Orders    Pneumococcal conjugate vaccine, 20-valent (PREVNAR 20) (Completed)    BMI less than 19,adult              Unintentional Weight Loss?Depression:  Chronic, known to provider  - will trial remerone 7.5mg to see if we can have improvement in mood/appetite  - if no improvement will trial other options  - he is also going to see his GI to look into dysphagia as a reason for unintentional weight loss    MWV:Personal health Plan:  - START mirtazapine 7.5mg daily to help with your mood and appetite  - consider going to therapy, if you call the area  on aging they might be able to help you find one that takes your insurance [phone]  - eat at least 3 meals a day  - we gave you your pneumonia shot today     Daniella Hamlin MD

## 2024-06-10 ENCOUNTER — OFFICE VISIT (OUTPATIENT)
Dept: URGENT CARE | Facility: CLINIC | Age: 84
End: 2024-06-10
Payer: COMMERCIAL

## 2024-06-10 VITALS
BODY MASS INDEX: 18.36 KG/M2 | WEIGHT: 117 LBS | TEMPERATURE: 98.1 F | OXYGEN SATURATION: 95 % | RESPIRATION RATE: 16 BRPM | SYSTOLIC BLOOD PRESSURE: 132 MMHG | HEIGHT: 67 IN | DIASTOLIC BLOOD PRESSURE: 54 MMHG | HEART RATE: 71 BPM

## 2024-06-10 DIAGNOSIS — J30.2 SEASONAL ALLERGIES: Primary | ICD-10-CM

## 2024-06-10 DIAGNOSIS — J30.2 SEASONAL ALLERGIC RHINITIS, UNSPECIFIED TRIGGER: ICD-10-CM

## 2024-06-10 PROCEDURE — 99212 OFFICE O/P EST SF 10 MIN: CPT | Performed by: PHYSICIAN ASSISTANT

## 2024-06-10 RX ORDER — AZELASTINE 1 MG/ML
2 SPRAY, METERED NASAL 2 TIMES DAILY
Qty: 30 ML | Refills: 0 | Status: SHIPPED | OUTPATIENT
Start: 2024-06-10 | End: 2024-06-25

## 2024-06-10 RX ORDER — PREDNISONE 10 MG/1
30 TABLET ORAL DAILY
Qty: 21 TABLET | Refills: 0 | Status: SHIPPED | OUTPATIENT
Start: 2024-06-10 | End: 2024-06-17

## 2024-06-10 NOTE — PROGRESS NOTES
[facility] [place] NOTE:      Name: Evangelina Braxton, 83 y.o.    CSN:[id number]   MRN:[id number]    PCP: Daniella Hamlin MD    ALL:    Allergies   Allergen Reactions    Doxycycline Other    Sulfamethoxazole-Trimethoprim Other and Unknown     Nausea and vomiting       History:    Chief Complaint: URI (Cough, sinus drainage, watery eyes x 2 days. Similar symptoms 5/25 but states these did improve then returned. )    Encounter Date: 6/10/2024  1116    HPI: The history was obtained from the patient. Evangelina is a 83 y.o. male, who presents with a chief complaint of URI (Cough, sinus drainage, watery eyes x 2 days. Similar symptoms 5/25 but states these did improve then returned. ) Pt states that he has clear productive cough and cough has gotten worse yesterday. Pt is asking for prednisone since he states it has helped him previously. Pt is having sore throat as well.    PMHx:    Past Medical History:   Diagnosis Date    Laceration without foreign body of pharynx and cervical esophagus, initial encounter     Tear of esophagus    Personal history of diseases of the skin and subcutaneous tissue 10/15/2019    History of pressure injury of skin    Personal history of other diseases of the circulatory system     H/O supraventricular tachycardia    Personal history of other diseases of the digestive system     History of hematemesis    Personal history of other diseases of the digestive system     History of Cabello's esophagus    Personal history of pneumonia (recurrent)     History of pneumonia              Current Outpatient Medications   Medication Sig Dispense Refill    aspirin 81 mg chewable tablet Chew 1 tablet (81 mg) once daily.      dilTIAZem CD (Cardizem CD) 180 mg 24 hr capsule Take 1 capsule (180 mg) by mouth once daily. 90 capsule 2    fluticasone (Flonase) 50 mcg/actuation nasal spray Administer 2 sprays into each nostril once daily. 16 g 2    gabapentin (Neurontin) 100 mg capsule Take 1 capsule  (100 mg) by mouth once daily at bedtime.      Lactobacillus acidophilus (PROBIOTIC ORAL) Take by mouth.      loratadine (Claritin) 10 mg tablet Take 1 tablet (10 mg) by mouth once daily. For allergies 14 tablet 0    metoprolol tartrate (Lopressor) 25 mg tablet Take 1 tablet (25 mg) by mouth 2 times a day. 180 tablet 3    mirtazapine (Remeron) 7.5 mg tablet Take 1 tablet (7.5 mg) by mouth once daily at bedtime. 30 tablet 3    montelukast (Singulair) 10 mg tablet take 1 tablet by mouth every evening 90 tablet 1    pantoprazole (ProtoNix) 40 mg EC tablet Take 1 tablet (40 mg) by mouth 2 times a day. 180 tablet 1    tamsulosin (Flomax) 0.4 mg 24 hr capsule Take 1 capsule (0.4 mg) by mouth once daily. 90 capsule 3    warfarin (Coumadin) 2 mg tablet take 1 tablet by mouth once daily or as directed BY INR 90 tablet 1    azelastine (Astelin) 137 mcg (0.1 %) nasal spray Administer 2 sprays into each nostril 2 times a day for 15 days. Use in each nostril as directed 30 mL 0    predniSONE (Deltasone) 10 mg tablet Take 3 tablets (30 mg) by mouth once daily for 7 days. 21 tablet 0     No current facility-administered medications for this visit.         PMSx:    Past Surgical History:   Procedure Laterality Date    OTHER SURGICAL HISTORY  06/05/2019    Paraesophageal hiatal hernia repair       Fam Hx:   Family History   Problem Relation Name Age of Onset    Hypertension Mother      Other (cardiac disorder) Father      Hypertension Sister      Stroke Brother      Prostate cancer Brother      Stroke Other grandparent        SOC. Hx:     Social History     Socioeconomic History    Marital status:      Spouse name: Not on file    Number of children: Not on file    Years of education: Not on file    Highest education level: Not on file   Occupational History    Not on file   Tobacco Use    Smoking status: Never    Smokeless tobacco: Never   Vaping Use    Vaping status: Never Used   Substance and Sexual Activity    Alcohol use:  Not Currently    Drug use: Never    Sexual activity: Not Currently   Other Topics Concern    Not on file   Social History Narrative    Not on file     Social Determinants of Health     Financial Resource Strain: Not on file   Food Insecurity: Not on file   Transportation Needs: Not on file   Physical Activity: Not on file   Stress: Not on file   Social Connections: Not on file   Intimate Partner Violence: Not on file   Housing Stability: Not on file         Vitals:    06/10/24 1107   BP: 132/54   Pulse: 71   Resp: 16   Temp: 36.7 °C (98.1 °F)   SpO2: 95%     53.1 kg (117 lb)          Physical Exam  Vitals and nursing note reviewed.   Constitutional:       General: He is not in acute distress.     Appearance: Normal appearance. He is not ill-appearing.   HENT:      Head: Normocephalic and atraumatic.      Nose: Nose normal.      Mouth/Throat:      Pharynx: No oropharyngeal exudate or posterior oropharyngeal erythema.   Eyes:      Extraocular Movements: Extraocular movements intact.      Pupils: Pupils are equal, round, and reactive to light.   Cardiovascular:      Rate and Rhythm: Normal rate.   Pulmonary:      Effort: Pulmonary effort is normal.      Breath sounds: Normal breath sounds. No wheezing.   Musculoskeletal:         General: Normal range of motion.   Skin:     General: Skin is warm and dry.   Neurological:      Mental Status: He is alert and oriented to person, place, and time.   Psychiatric:         Mood and Affect: Mood normal.         Behavior: Behavior normal.           LABORATORY @ RADIOLOGICAL IMAGING (if done):     He did not want any screening/testing today    ____________________________________________________________________    I did personally review Evangelina's past medical history, surgical history, social history, as well as family history (when relevant).  In this case, I also oversaw the his drug management by reviewing his medication list, allergy list, as well as the medications that I  prescribed during the UC course and/or recommended as an out-patient (including possible OTC medications such as acetaminophen, NSAIDs , etc).    After reviewing the items above, I did look at previous medical documentation, such as recent hospitalizations, office visits, and/or recent consultations with PCP/specialist.                          SDOH:   Another factor that I considered in Evangelina's care was his Social Determinants of Health (SDOH). During this UC encounter, he did not have social determinants of health. Those SDOH influencing Evangelina's care are: none      UC COURSE/MEDICAL DECISION MAKING:    Evangelina is a 83 y.o., who presents with a working diagnosis of   1. Seasonal allergies    2. Seasonal allergic rhinitis, unspecified trigger     with a differential to include:   - Allergies: Pt states he's been having recurrent clear productive cough with sinus drainage and watery eyes.     - Viral URI: Symptoms of cough and sore throat with sinus drainage confirm suspicion. Denies fever or near sick contacts    - GERD: Sore throat and cough can be symptoms of GERD. Pt denies any heartburn of cough and sore throat worsened by certain foods, drinks or laying flat.    Patient denied COVID testing today. Patient Rx'd nasal spray as well as prednisone taper.     Note initiated by:  ARVIN Lopez-Student, [facility]    Supervised by  Talon Zacarias PA-C   Advanced Practice Provider  [facility]    I was present with the PA student who participated in the documentation of this note. I have personally seen and re-examined the patient and performed the medical decision-making components (assessment and plan of care). I have reviewed the PA student documentation and verified the findings in the note as written with additions or exceptions as stated in the body of this note.    Talon Zacarias PA-C

## 2024-06-12 ENCOUNTER — ANTICOAGULATION - WARFARIN VISIT (OUTPATIENT)
Dept: PHARMACY | Facility: HOSPITAL | Age: 84
End: 2024-06-12
Payer: COMMERCIAL

## 2024-06-12 DIAGNOSIS — I48.11 LONGSTANDING PERSISTENT ATRIAL FIBRILLATION (MULTI): Primary | ICD-10-CM

## 2024-06-12 LAB
POC INR: 3.6
POC PROTHROMBIN TIME: NORMAL

## 2024-06-12 PROCEDURE — 99211 OFF/OP EST MAY X REQ PHY/QHP: CPT | Performed by: PHARMACIST

## 2024-06-12 PROCEDURE — 85610 PROTHROMBIN TIME: CPT | Mod: QW

## 2024-06-12 NOTE — PROGRESS NOTES
Pt enrolled in [facility] for management of Longstanding persistent atrial fibrillation (Multi) [I48.11].     Pt current location in clinic.     Current anticoagulant: Warfarin    Time since last visit: 4 weeks    Last INR: 3.2 on warfarin 10 mg in the previous week. Pt was instructed to hold x 1 then continue current dose at last visit.    Last Creatinine:   Lab Results   Component Value Date    CREATININE 0.98 04/24/2024     Last hemoglobin/hematocrit:  Lab Results   Component Value Date    HGB 13.7 04/24/2024     Lab Results   Component Value Date    HCT 44.5 04/24/2024       Current INR: 3.6 is SUPRAtherapeutic for goal range of 2.0-3.0 and is reflective of 10 mg in the previous week prior to visit.    Patient denies any missed doses.  Patient denies any diet changes, or OTC/herbal supplement changes since last visit.  Pt recently started prednisone for a viral infection, which he does think is helping.  He is on day 3 of 7.  Patient denies any adverse reactions or barriers.  Patient denies any CP/SOB, fatigue, bleeding or bruising since last visit.   Patient denies any change in alcohol or tobacco use since last visit.   Patient denies any upcoming dental procedures.  Pt is having a balloon procedure on his esophagus with Dr Martinez in [place].  Pt will hold for 5 days, beginning 6/16 for 6/21 procedure.  Pt instructed to begin taking coumadin after procedure completed.  Bridging not necessary per Daniella Hamlin.        Plan:  Patient was instructed to hold x 2 then take 1 mg daily until begin holding for procedure.    INR follow up will occur in 1 weeks.  Patient was instructed to maintain consistent vegetable intake, to monitor for any bruising or bleeding, and to call with any medication changes or concerns.    Pt handout given with above information    Kerry Spence, AdiliaD

## 2024-06-14 ENCOUNTER — OFFICE VISIT (OUTPATIENT)
Dept: URGENT CARE | Facility: CLINIC | Age: 84
End: 2024-06-14
Payer: COMMERCIAL

## 2024-06-14 VITALS
DIASTOLIC BLOOD PRESSURE: 58 MMHG | OXYGEN SATURATION: 95 % | HEART RATE: 80 BPM | BODY MASS INDEX: 18.36 KG/M2 | TEMPERATURE: 98 F | RESPIRATION RATE: 16 BRPM | HEIGHT: 67 IN | WEIGHT: 117 LBS | SYSTOLIC BLOOD PRESSURE: 141 MMHG

## 2024-06-14 DIAGNOSIS — J06.9 ACUTE UPPER RESPIRATORY INFECTION: Primary | ICD-10-CM

## 2024-06-14 PROCEDURE — 99213 OFFICE O/P EST LOW 20 MIN: CPT | Performed by: NURSE PRACTITIONER

## 2024-06-14 RX ORDER — AZITHROMYCIN 250 MG/1
TABLET, FILM COATED ORAL
Qty: 6 TABLET | Refills: 0 | Status: SHIPPED | OUTPATIENT
Start: 2024-06-14 | End: 2024-06-19

## 2024-06-14 NOTE — PROGRESS NOTES
83 y.o. male presents for evaluation of productive moist cough with green sputum, nasal congestion for the past 5 days.  States he was seen here few days ago and given prednisone.  States that he usually needs a Z-Abram to clear symptoms in conjunction with prednisone. States he has an upcoming procedure and wants to get his URI cleared up before that. Denies fever, shortness of breath, chest pain, nausea, vomiting, diarrhea, fatigue or weakness or any other associated symptom or complaint.      Vitals:    06/14/24 1337   BP: 141/58   Pulse: 80   Resp: 16   Temp: 36.7 °C (98 °F)   SpO2: 95%       Allergies   Allergen Reactions    Doxycycline Other    Sulfamethoxazole-Trimethoprim Other and Unknown     Nausea and vomiting       Medication Documentation Review Audit       Reviewed by Jacquie Ascencio MA (Medical Assistant) on 06/14/24 at 1337      Medication Order Taking? Sig Documenting Provider Last Dose Status   aspirin 81 mg chewable tablet [id number] Yes Chew 1 tablet (81 mg) once daily. Historical Provider, MD Taking Active   azelastine (Astelin) 137 mcg (0.1 %) nasal spray [id number] Yes Administer 2 sprays into each nostril 2 times a day for 15 days. Use in each nostril as directed Talon Zacarias PA-C Taking Active   dilTIAZem CD (Cardizem CD) 180 mg 24 hr capsule [id number] Yes Take 1 capsule (180 mg) by mouth once daily. Florecita Carlisle DO Taking Active   fluticasone (Flonase) 50 mcg/actuation nasal spray [id number] Yes Administer 2 sprays into each nostril once daily. Daniella Hamlin MD Taking Active   gabapentin (Neurontin) 100 mg capsule [id number] Yes Take 1 capsule (100 mg) by mouth once daily at bedtime. Historical Provider, MD Taking Active   Lactobacillus acidophilus (PROBIOTIC ORAL) [id number] Yes Take by mouth. Historical Provider, MD Taking Active   loratadine (Claritin) 10 mg tablet [id number] Yes Take 1 tablet (10 mg) by mouth once daily. For allergies Florecita Saldivar, KRISTINA-AVILA Taking Active    metoprolol tartrate (Lopressor) 25 mg tablet [id number] Yes Take 1 tablet (25 mg) by mouth 2 times a day. Daniella Hamlin MD Taking Active   mirtazapine (Remeron) 7.5 mg tablet [id number] Yes Take 1 tablet (7.5 mg) by mouth once daily at bedtime. Daniella Hamlin MD Taking Active   montelukast (Singulair) 10 mg tablet [id number] Yes take 1 tablet by mouth every evening Florecita Carlisle DO Taking Active   pantoprazole (ProtoNix) 40 mg EC tablet [id number] Yes Take 1 tablet (40 mg) by mouth 2 times a day. Florecita Carlisle DO Taking Active   predniSONE (Deltasone) 10 mg tablet [id number] Yes Take 3 tablets (30 mg) by mouth once daily for 7 days. Talon Zacarias PA-C Taking Active   tamsulosin (Flomax) 0.4 mg 24 hr capsule [id number] Yes Take 1 capsule (0.4 mg) by mouth once daily. Daniella Hamlin MD Taking Active   warfarin (Coumadin) 2 mg tablet [id number] Yes take 1 tablet by mouth once daily or as directed BY INR Florecita Carlisle DO Taking Active                    Past Medical History:   Diagnosis Date    Laceration without foreign body of pharynx and cervical esophagus, initial encounter     Tear of esophagus    Personal history of diseases of the skin and subcutaneous tissue 10/15/2019    History of pressure injury of skin    Personal history of other diseases of the circulatory system     H/O supraventricular tachycardia    Personal history of other diseases of the digestive system     History of hematemesis    Personal history of other diseases of the digestive system     History of Cabello's esophagus    Personal history of pneumonia (recurrent)     History of pneumonia       Past Surgical History:   Procedure Laterality Date    OTHER SURGICAL HISTORY  06/05/2019    Paraesophageal hiatal hernia repair       ROS  See HPI    Physical Exam  Vitals and nursing note reviewed.   Constitutional:       General: He is not in acute distress.     Appearance: Normal appearance. He is not ill-appearing or  toxic-appearing.   HENT:      Head: Normocephalic and atraumatic.      Right Ear: Tympanic membrane, ear canal and external ear normal.      Left Ear: Tympanic membrane, ear canal and external ear normal.      Nose: Congestion present.      Mouth/Throat:      Mouth: Mucous membranes are moist.      Pharynx: Oropharynx is clear.   Eyes:      Extraocular Movements: Extraocular movements intact.      Conjunctiva/sclera: Conjunctivae normal.      Pupils: Pupils are equal, round, and reactive to light.   Cardiovascular:      Rate and Rhythm: Normal rate and regular rhythm.   Pulmonary:      Effort: Pulmonary effort is normal.      Breath sounds: Normal breath sounds.      Comments: Moist cough  Lymphadenopathy:      Cervical: No cervical adenopathy.   Skin:     General: Skin is warm.   Neurological:      General: No focal deficit present.      Mental Status: He is alert and oriented to person, place, and time.   Psychiatric:         Mood and Affect: Mood normal.         Behavior: Behavior normal.           Assessment/Plan/MDM  Evangelina was seen today for uri.  Diagnoses and all orders for this visit:  Acute upper respiratory infection (Primary)  -     azithromycin (Zithromax Z-Abram) 250 mg tablet; Take 2 tablets (500 mg) on  Day 1,  followed by 1 tablet (250 mg) once daily on Days 2 through 5.    Encouraged pt to continue prednisone course until it is finished as well as otc cold remedies PRN, push PO fluids and rest. Patient's clinical presentation is otherwise unremarkable at this time. Patient is discharged with instructions to follow-up with primary care or seek emergency medical attention for worsening symptoms or any new concerns.      I did personally review Evangelina's past medical history, surgical history, social history, as well as family history (when relevant).  In this case, I also oversaw the his drug management by reviewing his medication list, allergy list, as well as the medications that I prescribed during  the UC course and/or recommended as an out-patient (including possible OTC medications such as acetaminophen, NSAIDs , etc).    After reviewing the items above, I did look at previous medical documentation, such as recent hospitalizations, office visits, and/or recent consultations with PCP/specialist.                          SDOH:   Another factor that I considered in Evangelina's care was his Social Determinants of Health (SDOH). During this UC encounter, he did not have social determinants of health. Those SDOH influencing Evangelina's care are: none      Atilio Spence CNP  [facility]  [phone]

## 2024-06-17 ENCOUNTER — ANTICOAGULATION - WARFARIN VISIT (OUTPATIENT)
Dept: PHARMACY | Facility: HOSPITAL | Age: 84
End: 2024-06-17
Payer: COMMERCIAL

## 2024-06-17 DIAGNOSIS — I48.11 LONGSTANDING PERSISTENT ATRIAL FIBRILLATION (MULTI): Primary | ICD-10-CM

## 2024-06-17 LAB
POC INR: 2.9
POC PROTHROMBIN TIME: NORMAL

## 2024-06-17 PROCEDURE — 85610 PROTHROMBIN TIME: CPT | Mod: QW

## 2024-06-17 PROCEDURE — 99211 OFF/OP EST MAY X REQ PHY/QHP: CPT | Performed by: PHARMACIST

## 2024-06-17 NOTE — PROGRESS NOTES
Pt enrolled in [facility] for management of Longstanding persistent atrial fibrillation (Multi) [I48.11].     Pt current location in clinic.     Current anticoagulant: Warfarin    Time since last visit: 5 days    Last INR: 3.6 on warfarin 10 mg in the previous week. Pt was instructed to hold warfarin once, then take just 1mg daily until INR today at last visit. This INR was in the context of prednisone for viral infection. He is to have procedure on Friday, has been holding warfarin since Friday.    Last Creatinine:   Lab Results   Component Value Date    CREATININE 0.98 04/24/2024     Last hemoglobin/hematocrit:  Lab Results   Component Value Date    HGB 13.7 04/24/2024     Lab Results   Component Value Date    HCT 44.5 04/24/2024       Current INR: 2.9 is therapeutic for goal range of 2.0-3.0 and is reflective of 5 mg in the previous week prior to visit with several held doses and prednisone and azithromycin     Patient denies any medication changes, diet changes, or OTC/herbal supplement changes since last visit.  Patient denies any adverse reactions or barriers.  Patient denies any CP/SOB, fatigue, bleeding or bruising since last visit.   Patient denies any change in alcohol or tobacco use since last visit.   Patient denies any upcoming medical or dental procedures.    Plan:  Patient was instructed to  continue to hold warfarin for procedure on Friday .  INR follow up will occur in 1.5 weeks.  Patient was instructed to maintain consistent vegetable intake (can eat a little extra this week to get that INR to drop a more), to monitor for any bruising or bleeding, and to call with any medication changes or concerns.    Pt handout given with above information    Augie Biswas, AdliiaD

## 2024-06-26 NOTE — PROGRESS NOTES
Subjective:  Evangelina Braxton is a 83 y.o. male who presents to clinic today for Follow-up (1 MO FU MOOD)      Started remeron 7.5mg daily at appt 1 month ago. He has not had any side effects from this medication. He has been feeling okay since then.  In the interim he saw his GI doctor and was scheduled for EGD with dilation.  He already has had this scope and was not told any negative results. He just had this done 7 days ago. He hasn't noticed that it is easier to eat yet.     Additionally, he was seen at urgent care x2 for his Uri symptoms and was started on prednisone and azithromycin. He notes that his symptoms have resolved. He continues to have a lot of allergy symptoms.     Review of Systems    Assessment/Plan:  Evangelina Braxton is a 83 y.o. male with a history of GERD, anemia, aortic stenosis, BPH, dysphagia  who presents to clinic today to address the following issues:   1. Protein-calorie malnutrition, unspecified severity (Multi)  Disability Placard      2. Depression, recurrent (CMS-HCC)  mirtazapine (Remeron) 15 mg tablet      3. Unintentional weight loss  mirtazapine (Remeron) 15 mg tablet    Disability Placard      4. Severe persistent asthma, unspecified whether complicated (Multi)        5. Obstructive lung disease (Multi)            Problem List Items Addressed This Visit       Obstructive lung disease (Multi)    Overview     Chronic, hx of tobacco use. Not currently on any therapy.         Current Assessment & Plan     - Chronic problem, unresolved, new to this provider, requires further workup and treatment   - Discussed with pt that we will continue to monitor but that he may need to have maintenance therapy with inhalers int eh future         Severe persistent asthma (Multi)    Overview     Chronic, hx of tobacco use. Not currently on any therapy.         Current Assessment & Plan     - Chronic problem, unresolved, new to this provider, requires further workup and treatment  "  - Discussed with pt that we will continue to monitor but that he may need to have maintenance therapy with inhalers int eh future         Unintentional weight loss    Relevant Medications    mirtazapine (Remeron) 15 mg tablet    Other Relevant Orders    Disability Placard    Protein-calorie malnutrition, unspecified severity (Multi) - Primary    Relevant Orders    Disability Placard    Depression, recurrent (CMS-HCC)    Overview     Chronic depression with poor social outlets and a lot of loneliness. Started mirtazepine May 2024.         Current Assessment & Plan     Chronic known to provider, still active  Increase mirtazepine to 15mg daily         Relevant Medications    mirtazapine (Remeron) 15 mg tablet       Patient Instructions   Increase mirtazepine to 15mg daily. It is okay to take 2 7.5mg tablets until you  the new prescription.        Follow up: 1 month    Return precautions discussed.  An After Visit Summary was given to the patient.  All questions were answered and patient in agreement with plan.    Objective:  /54   Pulse 71   Ht 1.702 m (5' 7\")   Wt 53.2 kg (117 lb 4.8 oz)   SpO2 97%   BMI 18.37 kg/m²     Physical Exam  Vitals and nursing note reviewed.   Constitutional:       General: He is not in acute distress.     Appearance: He is not ill-appearing.   HENT:      Head: Normocephalic and atraumatic.      Mouth/Throat:      Mouth: Mucous membranes are moist.   Eyes:      General: No scleral icterus.        Right eye: No discharge.         Left eye: No discharge.      Extraocular Movements: Extraocular movements intact.      Conjunctiva/sclera: Conjunctivae normal.   Cardiovascular:      Rate and Rhythm: Normal rate and regular rhythm.   Pulmonary:      Effort: Pulmonary effort is normal. No respiratory distress.      Breath sounds: Normal breath sounds. No wheezing.   Skin:     General: Skin is dry.   Neurological:      General: No focal deficit present.      Mental Status: He is " alert and oriented to person, place, and time.   Psychiatric:         Thought Content: Thought content normal.         Judgment: Judgment normal.         I spent 18 minutes in total time for this visit including all related clinical activities before, during, and after the visit excluding other billable activities/procedure time.     Daniella Hamlin MD

## 2024-06-27 ENCOUNTER — APPOINTMENT (OUTPATIENT)
Dept: PRIMARY CARE | Facility: CLINIC | Age: 84
End: 2024-06-27
Payer: COMMERCIAL

## 2024-06-27 VITALS
OXYGEN SATURATION: 97 % | WEIGHT: 117.3 LBS | DIASTOLIC BLOOD PRESSURE: 54 MMHG | BODY MASS INDEX: 18.41 KG/M2 | HEIGHT: 67 IN | SYSTOLIC BLOOD PRESSURE: 112 MMHG | HEART RATE: 71 BPM

## 2024-06-27 DIAGNOSIS — J45.50 SEVERE PERSISTENT ASTHMA, UNSPECIFIED WHETHER COMPLICATED (MULTI): ICD-10-CM

## 2024-06-27 DIAGNOSIS — R63.4 UNINTENTIONAL WEIGHT LOSS: ICD-10-CM

## 2024-06-27 DIAGNOSIS — F33.9 DEPRESSION, RECURRENT (CMS-HCC): ICD-10-CM

## 2024-06-27 DIAGNOSIS — J44.9 OBSTRUCTIVE LUNG DISEASE (MULTI): ICD-10-CM

## 2024-06-27 DIAGNOSIS — E46 PROTEIN-CALORIE MALNUTRITION, UNSPECIFIED SEVERITY (MULTI): Primary | ICD-10-CM

## 2024-06-27 PROCEDURE — 1159F MED LIST DOCD IN RCRD: CPT | Performed by: STUDENT IN AN ORGANIZED HEALTH CARE EDUCATION/TRAINING PROGRAM

## 2024-06-27 PROCEDURE — 1036F TOBACCO NON-USER: CPT | Performed by: STUDENT IN AN ORGANIZED HEALTH CARE EDUCATION/TRAINING PROGRAM

## 2024-06-27 PROCEDURE — 3074F SYST BP LT 130 MM HG: CPT | Performed by: STUDENT IN AN ORGANIZED HEALTH CARE EDUCATION/TRAINING PROGRAM

## 2024-06-27 PROCEDURE — 1160F RVW MEDS BY RX/DR IN RCRD: CPT | Performed by: STUDENT IN AN ORGANIZED HEALTH CARE EDUCATION/TRAINING PROGRAM

## 2024-06-27 PROCEDURE — 1123F ACP DISCUSS/DSCN MKR DOCD: CPT | Performed by: STUDENT IN AN ORGANIZED HEALTH CARE EDUCATION/TRAINING PROGRAM

## 2024-06-27 PROCEDURE — 99214 OFFICE O/P EST MOD 30 MIN: CPT | Performed by: STUDENT IN AN ORGANIZED HEALTH CARE EDUCATION/TRAINING PROGRAM

## 2024-06-27 PROCEDURE — 3078F DIAST BP <80 MM HG: CPT | Performed by: STUDENT IN AN ORGANIZED HEALTH CARE EDUCATION/TRAINING PROGRAM

## 2024-06-27 PROCEDURE — 1158F ADVNC CARE PLAN TLK DOCD: CPT | Performed by: STUDENT IN AN ORGANIZED HEALTH CARE EDUCATION/TRAINING PROGRAM

## 2024-06-27 PROCEDURE — 1157F ADVNC CARE PLAN IN RCRD: CPT | Performed by: STUDENT IN AN ORGANIZED HEALTH CARE EDUCATION/TRAINING PROGRAM

## 2024-06-27 RX ORDER — MIRTAZAPINE 15 MG/1
15 TABLET, FILM COATED ORAL NIGHTLY
Qty: 30 TABLET | Refills: 3 | Status: SHIPPED | OUTPATIENT
Start: 2024-06-27 | End: 2024-12-24

## 2024-06-27 ASSESSMENT — PATIENT HEALTH QUESTIONNAIRE - PHQ9
1. LITTLE INTEREST OR PLEASURE IN DOING THINGS: NOT AT ALL
2. FEELING DOWN, DEPRESSED OR HOPELESS: NEARLY EVERY DAY
9. THOUGHTS THAT YOU WOULD BE BETTER OFF DEAD, OR OF HURTING YOURSELF: NOT AT ALL
6. FEELING BAD ABOUT YOURSELF - OR THAT YOU ARE A FAILURE OR HAVE LET YOURSELF OR YOUR FAMILY DOWN: NOT AT ALL
4. FEELING TIRED OR HAVING LITTLE ENERGY: NOT AT ALL
SUM OF ALL RESPONSES TO PHQ QUESTIONS 1-9: 6
7. TROUBLE CONCENTRATING ON THINGS, SUCH AS READING THE NEWSPAPER OR WATCHING TELEVISION: NOT AT ALL
SUM OF ALL RESPONSES TO PHQ9 QUESTIONS 1 & 2: 3
5. POOR APPETITE OR OVEREATING: NEARLY EVERY DAY
10. IF YOU CHECKED OFF ANY PROBLEMS, HOW DIFFICULT HAVE THESE PROBLEMS MADE IT FOR YOU TO DO YOUR WORK, TAKE CARE OF THINGS AT HOME, OR GET ALONG WITH OTHER PEOPLE: SOMEWHAT DIFFICULT
3. TROUBLE FALLING OR STAYING ASLEEP: NOT AT ALL
8. MOVING OR SPEAKING SO SLOWLY THAT OTHER PEOPLE COULD HAVE NOTICED. OR THE OPPOSITE, BEING SO FIGETY OR RESTLESS THAT YOU HAVE BEEN MOVING AROUND A LOT MORE THAN USUAL: NOT AT ALL

## 2024-06-27 NOTE — ASSESSMENT & PLAN NOTE
- Chronic problem, unresolved, new to this provider, requires further workup and treatment   - Discussed with pt that we will continue to monitor but that he may need to have maintenance therapy with inhalers int eh future

## 2024-06-27 NOTE — PATIENT INSTRUCTIONS
Increase mirtazepine to 15mg daily. It is okay to take 2 7.5mg tablets until you  the new prescription.

## 2024-06-28 ENCOUNTER — ANTICOAGULATION - WARFARIN VISIT (OUTPATIENT)
Dept: PHARMACY | Facility: HOSPITAL | Age: 84
End: 2024-06-28
Payer: COMMERCIAL

## 2024-06-28 DIAGNOSIS — I48.11 LONGSTANDING PERSISTENT ATRIAL FIBRILLATION (MULTI): Primary | ICD-10-CM

## 2024-06-28 LAB
POC INR: 1.2
POC PROTHROMBIN TIME: NORMAL

## 2024-06-28 PROCEDURE — 85610 PROTHROMBIN TIME: CPT | Mod: QW

## 2024-06-28 PROCEDURE — 99211 OFF/OP EST MAY X REQ PHY/QHP: CPT | Performed by: PHARMACIST

## 2024-06-28 NOTE — PROGRESS NOTES
Pt enrolled in [facility] for management of Longstanding persistent atrial fibrillation (Multi) [I48.11].     Pt current location in clinic.     Current anticoagulant: Warfarin    Time since last visit: 2 weeks    Last INR: 2.9 on warfarin 7 mg in the previous week. Pt was instructed to continue to hold for procedure on Friday at last visit. He restarted warfarin on Saturday with a 2mg dose then resumed 10mg weekly.     Last Creatinine:   Lab Results   Component Value Date    CREATININE 0.98 04/24/2024     Last hemoglobin/hematocrit:  Lab Results   Component Value Date    HGB 13.7 04/24/2024     Lab Results   Component Value Date    HCT 44.5 04/24/2024       Current INR: 1.2 is SUBtherapeutic for goal range of 2.0-3.0 and is reflective of 10 mg in the previous week prior to visit.    Patient denies any missed doses.  Patient denies any medication changes, diet changes, or OTC/herbal supplement changes since last visit.  Patient denies any adverse reactions or barriers.  Patient denies any CP/SOB, fatigue, bleeding or bruising since last visit.   Patient denies any change in alcohol or tobacco use since last visit.   Patient denies any upcoming medical or dental procedures.    Plan:  Patient was instructed to  take 4 mg today, 3mg tomorrow, then resume usual dosing .  INR follow up will occur in 1.5 weeks.  Patient was instructed to maintain consistent vegetable intake, to monitor for any bruising or bleeding, and to call with any medication changes or concerns.    Pt handout given with above information    Augie Biswas PharmD  P:[phone]  F:[phone]

## 2024-07-02 DIAGNOSIS — J30.2 SEASONAL ALLERGIC RHINITIS, UNSPECIFIED TRIGGER: ICD-10-CM

## 2024-07-03 ENCOUNTER — ANTICOAGULATION - WARFARIN VISIT (OUTPATIENT)
Dept: PHARMACY | Facility: HOSPITAL | Age: 84
End: 2024-07-03
Payer: COMMERCIAL

## 2024-07-03 DIAGNOSIS — I48.11 LONGSTANDING PERSISTENT ATRIAL FIBRILLATION (MULTI): Primary | ICD-10-CM

## 2024-07-03 LAB
POC INR: 2.8
POC PROTHROMBIN TIME: NORMAL

## 2024-07-03 PROCEDURE — 99211 OFF/OP EST MAY X REQ PHY/QHP: CPT | Performed by: PHARMACIST

## 2024-07-03 PROCEDURE — 85610 PROTHROMBIN TIME: CPT | Mod: QW

## 2024-07-03 RX ORDER — AZELASTINE 1 MG/ML
SPRAY, METERED NASAL
Qty: 30 ML | Refills: 0 | OUTPATIENT
Start: 2024-07-03

## 2024-07-03 NOTE — PROGRESS NOTES
Pt enrolled in [facility] for management of Longstanding persistent atrial fibrillation (Multi) [I48.11].     Pt current location in clinic.     Current anticoagulant: Warfarin    Time since last visit: 5 days    Last INR: 1.2 on warfarin 10 mg in the previous week. Pt was instructed to take 4 mg once, then 3 mg once, then resume usual dosing at last visit.    Last Creatinine:   Lab Results   Component Value Date    CREATININE 0.98 04/24/2024     Last hemoglobin/hematocrit:  Lab Results   Component Value Date    HGB 13.7 04/24/2024     Lab Results   Component Value Date    HCT 44.5 04/24/2024       Current INR: 2.8 is therapeutic for goal range of 2.0-3.0 and is reflective of 10 mg in the previous week prior to visit.    Patient denies any missed doses.  Patient denies any medication changes, diet changes, or OTC/herbal supplement changes since last visit.  Patient denies any adverse reactions or barriers.  Patient denies any CP/SOB, fatigue, bleeding or bruising since last visit.   Patient denies any change in alcohol or tobacco use since last visit.   Patient denies any upcoming medical or dental procedures.    Plan:  Patient was instructed to continue with current warfarin dose.  INR follow up will occur in 2 weeks as his INR has been above goal recently on similar dose in the context of antibiotics and steroids.  Patient was instructed to maintain consistent vegetable intake, to monitor for any bruising or bleeding, and to call with any medication changes or concerns.    Pt handout given with above information    Augie Biswas, AdiliaD  P:[phone]  F:[phone]

## 2024-07-11 ENCOUNTER — OFFICE VISIT (OUTPATIENT)
Dept: URGENT CARE | Facility: CLINIC | Age: 84
End: 2024-07-11
Payer: COMMERCIAL

## 2024-07-11 VITALS
OXYGEN SATURATION: 96 % | DIASTOLIC BLOOD PRESSURE: 50 MMHG | RESPIRATION RATE: 18 BRPM | SYSTOLIC BLOOD PRESSURE: 110 MMHG | TEMPERATURE: 97.8 F | HEIGHT: 67 IN | BODY MASS INDEX: 18.36 KG/M2 | HEART RATE: 71 BPM | WEIGHT: 117 LBS

## 2024-07-11 DIAGNOSIS — T14.8XXA HEMATOMA: Primary | ICD-10-CM

## 2024-07-11 PROCEDURE — 99212 OFFICE O/P EST SF 10 MIN: CPT | Performed by: NURSE PRACTITIONER

## 2024-07-11 PROCEDURE — 99213 OFFICE O/P EST LOW 20 MIN: CPT | Performed by: NURSE PRACTITIONER

## 2024-07-11 NOTE — PROGRESS NOTES
[facility]  Florecita Saldivar, APRN-CNP     Visit Note - 7/11/2024 4:19 PM   This note was generated with voice recognition software and may contain errors including spelling, grammar, syntax, and misrecognization of what was dictated.    Patient: Evangelina Braxton, MRN: [id number], 83 y.o., male   PCP: Daniella Hamlin MD  ------------------------------------  ALLERGIES:   Allergies   Allergen Reactions    Doxycycline Other    Sulfamethoxazole-Trimethoprim Other and Unknown     Nausea and vomiting        CURRENT MEDICATIONS:   Current Outpatient Medications   Medication Instructions    aspirin 81 mg, oral, Daily    azelastine (Astelin) 137 mcg (0.1 %) nasal spray 2 sprays, Each Nostril, 2 times daily, Use in each nostril as directed    dilTIAZem CD (CARDIZEM CD) 180 mg, oral, Daily    fluticasone (Flonase) 50 mcg/actuation nasal spray 2 sprays, Each Nostril, Daily    gabapentin (NEURONTIN) 100 mg, oral, Nightly    Lactobacillus acidophilus (PROBIOTIC ORAL) oral    metoprolol tartrate (LOPRESSOR) 25 mg, oral, 2 times daily    mirtazapine (REMERON) 15 mg, oral, Nightly    montelukast (SINGULAIR) 10 mg, oral, Nightly    pantoprazole (PROTONIX) 40 mg, oral, 2 times daily    tamsulosin (FLOMAX) 0.4 mg, oral, Daily    warfarin (Coumadin) 2 mg tablet take 1 tablet by mouth once daily or as directed BY INR     ------------------------------------  PAST MEDICAL HX:  Patient Active Problem List   Diagnosis    Allergies    Anemia, chronic disease    Aortic regurgitation, congenital (HHS-HCC)    Aortic stenosis, moderate    Atrial fibrillation (Multi)    Back pain    Chest pain    Benign hypertension    Bilateral pleural effusion    Blurry vision    BPH (benign prostatic hyperplasia)    Bursitis    Carotid artery stenosis    Obstructive lung disease (Multi)    Dysphagia    GERD (gastroesophageal reflux disease)    Hiatal hernia    Hyperlipidemia    Lower extremity edema    Mild depression    Obstructive sleep  apnea    Sacral pressure sore    Seasonal allergies    Severe persistent asthma (Multi)    Spondylosis of cervical region without myelopathy or radiculopathy    Unintentional weight loss    Ganglion cyst    Glaucoma    Heartburn    Hydronephrosis    Erectile dysfunction    Calculus of kidney    Aortic valve sclerosis    Asthma (HHS-HCC)    Ingrowing nail    Onychomycosis    Tinea unguium    Varicose veins of left lower extremity    SVT (supraventricular tachycardia) (CMS-HCC)    Arthritis    Protein-calorie malnutrition, unspecified severity (Multi)    Longstanding persistent atrial fibrillation (Multi)    Depression, recurrent (CMS-HCC)      SURGICAL HX:  Past Surgical History:   Procedure Laterality Date    OTHER SURGICAL HISTORY  06/05/2019    Paraesophageal hiatal hernia repair      FAMILY HX:   No pertinent history.   SOCIAL HX:    reports that he has never smoked. He has never been exposed to tobacco smoke. He has never used smokeless tobacco.  ------------------------------------  CHIEF COMPLAINT:   Chief Complaint   Patient presents with    Wound Check     Right arm wound from IV on 6-21-24       HISTORY OF PRESENT ILLNESS: The history was obtained from patient. Evangelina is a 83 y.o. male, who presents with a chief complaint of a discolored area on his R arm - it started after having an IV on 6/21/24. Reports he believes the area has gotten a little larger since onset. Aside from the IV, he denies any known injury or precipitating factors. Denies any pain, itching, swelling, or drainage from the area. Denies any fever, chills, body aches, malaise, nausea, vomiting, or other constitutional S/S. Has not tried any OTC medications or conservative measures for management of symptoms. Denies any other complaints. Is currently on coumadin - reports PT/INR have been stable and he has them checked regularly.    REVIEW OF SYSTEMS:  10 systems reviewed negative with exception of history of present illness as listed  "above.    TODAY'S VITALS: /50 (BP Location: Left arm, Patient Position: Sitting, BP Cuff Size: Adult)   Pulse 71   Temp 36.6 °C (97.8 °F)   Resp 18   Ht 1.702 m (5' 7\")   Wt 53.1 kg (117 lb)   SpO2 96%   BMI 18.32 kg/m²     PHYSICAL EXAMINATION:  General:  Pleasant, elderly male, alert and oriented, in no acute distress.    Eyes:  Pupils equal, round and reactive to light. Eyes non-icteric; conjunctiva clear.  HENT:  Normocephalic.   Neck:  Supple; no lymphadenopathy  Respiratory:  Lungs are clear to auscultation, Respirations are easy and non-labored, Breath sounds are equal, Symmetrical chest wall expansion.    Cardiovascular:  Normal rate, Regular rhythm. Normal S1S2. No m/r/g.  Musculoskeletal:  Normal range of motion, normal strength, no joint tenderness or swelling.     Integumentary:  Pink, warm, dry. R forearm noted to have a linear, bloody scab surrounded by an approx 5x6 cm area of non-blanchable, mild ecchymosis. No induration/edema, tenderness, fluctuance, pointing, warmth, or surrounding erythema. No active drainage or crusting. No other areas of rash/discoloration noted.   Neurologic:  Alert, Oriented, Normal sensory, Normal motor function.    Cognition and Speech:  Oriented, Speech clear and coherent.    Psychiatric:  Cooperative, Appropriate mood & affect.       ------------------------------------  Medical Decision Making  LABORATORY or RADIOLOGICAL IMAGING ORDERS/RESULTS:   None    IMPRESSION/PLAN:  Course: Worsening; stable    1. Hematoma  Sxs consistent with mild hematoma/area of ecchymosis - likely continuing to heal after recent IV insertion during a procedure. Currently on Coumadin which is likely contributing to sxs. No red flags or signs of complication or infection at this time. INRs have been stable, per patient. No other signs of abnormal bleeding noted. Encouraged conservative measures and watchful waiting. Encouraged to keep area clean/dry (use soap and water). Reviewed " expectations for treatment and resolution of discoloration, as well as red flags to watch for- margins of discoloration marked to help with monitoring. Advised to follow-up with primary care provider in 24-48 hours for any increase in severity of symptoms or to seek care sooner if any additional concerns develop (fever, malaise, streaking, swelling/redness/induration/pain, etc). Should continue close monitoring of PT/INR with [facility]. Patient agreed with plan of care; questions were encouraged and answered.       KRISTINA Zimmerman-CNP   Advanced Practice Provider  [facility]

## 2024-07-17 ENCOUNTER — ANTICOAGULATION - WARFARIN VISIT (OUTPATIENT)
Dept: PHARMACY | Facility: HOSPITAL | Age: 84
End: 2024-07-17
Payer: COMMERCIAL

## 2024-07-17 DIAGNOSIS — I48.11 LONGSTANDING PERSISTENT ATRIAL FIBRILLATION (MULTI): ICD-10-CM

## 2024-07-17 LAB
INR IN PPP BY COAGULATION ASSAY EXTERNAL: 2.6
PROTHROMBIN TIME (PT) IN PPP BY COAGULATION ASSAY EXTERNAL: NORMAL

## 2024-07-17 PROCEDURE — 99211 OFF/OP EST MAY X REQ PHY/QHP: CPT | Performed by: PHARMACIST

## 2024-07-17 NOTE — PROGRESS NOTES
Pt enrolled in [facility] for management of I48.11    Pt current location in clinic.     Current anticoagulant: Warfarin    Time since last visit: 2 weeks    Last INR: 2.8 on warfarin 15 mg in the previous week. Pt was instructed to take 10mg TWD at last visit.    Last Creatinine:   Lab Results   Component Value Date    CREATININE 0.98 04/24/2024     Last hemoglobin/hematocrit:  Lab Results   Component Value Date    HGB 13.7 04/24/2024     Lab Results   Component Value Date    HCT 44.5 04/24/2024       Current INR: 2.6 is therapeutic for goal range of 2.0-3.0 and is reflective of 10 mg in the previous week prior to visit.    Patient denies any missed doses.  Patient denies any medication changes, diet changes, or OTC/herbal supplement changes since last visit.  Patient denies any adverse reactions or barriers.  Patient denies any CP/SOB, fatigue, bleeding or bruising since last visit.   Patient denies any change in alcohol or tobacco use since last visit.   Patient denies any upcoming medical or dental procedures.    Plan:  Patient was instructed to continue with current warfarin dose.  INR follow up will occur in 4 weeks.  Patient was instructed to maintain consistent vegetable intake, to monitor for any bruising or bleeding, and to call with any medication changes or concerns.    Pt handout given with above information    BINA BLACKMAN  P:[phone]  F:[phone]

## 2024-07-25 ENCOUNTER — APPOINTMENT (OUTPATIENT)
Dept: PRIMARY CARE | Facility: CLINIC | Age: 84
End: 2024-07-25
Payer: COMMERCIAL

## 2024-08-14 ENCOUNTER — ANTICOAGULATION - WARFARIN VISIT (OUTPATIENT)
Dept: PHARMACY | Facility: HOSPITAL | Age: 84
End: 2024-08-14
Payer: COMMERCIAL

## 2024-08-14 DIAGNOSIS — I48.11 LONGSTANDING PERSISTENT ATRIAL FIBRILLATION (MULTI): Primary | ICD-10-CM

## 2024-08-14 LAB
POC INR: 2.7
POC PROTHROMBIN TIME: NORMAL

## 2024-08-14 PROCEDURE — 85610 PROTHROMBIN TIME: CPT | Mod: QW

## 2024-08-14 PROCEDURE — 99211 OFF/OP EST MAY X REQ PHY/QHP: CPT | Performed by: PHARMACIST

## 2024-08-14 NOTE — PROGRESS NOTES
Pt enrolled in [facility] for management of Longstanding persistent atrial fibrillation (Multi) [I48.11].     Pt current location in clinic.     Current anticoagulant: Warfarin    Time since last visit: 4 weeks    Last INR: 2.6 on warfarin 10 mg in the previous week. No changes were made at that time.    Last Creatinine:   Lab Results   Component Value Date    CREATININE 0.98 04/24/2024     Last hemoglobin/hematocrit:  Lab Results   Component Value Date    HGB 13.7 04/24/2024     Lab Results   Component Value Date    HCT 44.5 04/24/2024       Current INR: 2.7 is therapeutic for goal range of 2.0-3.0 and is reflective of 10 mg in the previous week prior to visit.    Patient denies any missed doses.  Patient denies any medication changes, diet changes, or OTC/herbal supplement changes since last visit.  Patient denies any adverse reactions or barriers.  Patient denies any CP/SOB, fatigue, bleeding or bruising since last visit.   Patient denies any change in alcohol or tobacco use since last visit.   Patient denies any upcoming medical or dental procedures.    Plan:  Patient was instructed to continue with current warfarin dose.  INR follow up will occur in 4 weeks.  Patient was instructed to maintain consistent vegetable intake, to monitor for any bruising or bleeding, and to call with any medication changes or concerns.    Pt handout given with above information    Mo Nicholas RPh  P:[phone]  F:[phone]

## 2024-08-15 ENCOUNTER — APPOINTMENT (OUTPATIENT)
Age: 84
End: 2024-08-15
Payer: COMMERCIAL

## 2024-08-15 ENCOUNTER — TELEPHONE (OUTPATIENT)
Age: 84
End: 2024-08-15

## 2024-08-15 ENCOUNTER — LAB (OUTPATIENT)
Facility: LAB | Age: 84
End: 2024-08-15
Payer: COMMERCIAL

## 2024-08-15 ENCOUNTER — TELEPHONE (OUTPATIENT)
Dept: PRIMARY CARE | Facility: CLINIC | Age: 84
End: 2024-08-15

## 2024-08-15 VITALS
DIASTOLIC BLOOD PRESSURE: 62 MMHG | SYSTOLIC BLOOD PRESSURE: 110 MMHG | WEIGHT: 122.4 LBS | BODY MASS INDEX: 19.21 KG/M2 | HEART RATE: 82 BPM | OXYGEN SATURATION: 94 % | HEIGHT: 67 IN

## 2024-08-15 DIAGNOSIS — R19.7 WATERY DIARRHEA: ICD-10-CM

## 2024-08-15 DIAGNOSIS — R63.4 UNINTENTIONAL WEIGHT LOSS: ICD-10-CM

## 2024-08-15 DIAGNOSIS — R19.7 WATERY DIARRHEA: Primary | ICD-10-CM

## 2024-08-15 LAB
ALBUMIN SERPL BCP-MCNC: 4.3 G/DL (ref 3.4–5)
ALP SERPL-CCNC: 46 U/L (ref 33–136)
ALT SERPL W P-5'-P-CCNC: 7 U/L (ref 10–52)
ANION GAP SERPL CALC-SCNC: 8 MMOL/L (ref 10–20)
AST SERPL W P-5'-P-CCNC: 15 U/L (ref 9–39)
BILIRUB SERPL-MCNC: 0.8 MG/DL (ref 0–1.2)
BUN SERPL-MCNC: 16 MG/DL (ref 6–23)
CALCIUM SERPL-MCNC: 8.9 MG/DL (ref 8.6–10.3)
CHLORIDE SERPL-SCNC: 106 MMOL/L (ref 98–107)
CO2 SERPL-SCNC: 30 MMOL/L (ref 21–32)
CREAT SERPL-MCNC: 1.03 MG/DL (ref 0.5–1.3)
EGFRCR SERPLBLD CKD-EPI 2021: 71 ML/MIN/1.73M*2
ERYTHROCYTE [DISTWIDTH] IN BLOOD BY AUTOMATED COUNT: 12.2 % (ref 11.5–14.5)
GLUCOSE SERPL-MCNC: 86 MG/DL (ref 74–99)
HCT VFR BLD AUTO: 43.7 % (ref 41–52)
HGB BLD-MCNC: 13.4 G/DL (ref 13.5–17.5)
MCH RBC QN AUTO: 29.5 PG (ref 26–34)
MCHC RBC AUTO-ENTMCNC: 30.7 G/DL (ref 32–36)
MCV RBC AUTO: 96 FL (ref 80–100)
NRBC BLD-RTO: 0 /100 WBCS (ref 0–0)
PLATELET # BLD AUTO: 295 X10*3/UL (ref 150–450)
POTASSIUM SERPL-SCNC: 4.4 MMOL/L (ref 3.5–5.3)
PROT SERPL-MCNC: 6.4 G/DL (ref 6.4–8.2)
RBC # BLD AUTO: 4.54 X10*6/UL (ref 4.5–5.9)
SODIUM SERPL-SCNC: 140 MMOL/L (ref 136–145)
WBC # BLD AUTO: 6.1 X10*3/UL (ref 4.4–11.3)

## 2024-08-15 PROCEDURE — 36415 COLL VENOUS BLD VENIPUNCTURE: CPT

## 2024-08-15 PROCEDURE — 80053 COMPREHEN METABOLIC PANEL: CPT

## 2024-08-15 PROCEDURE — 85027 COMPLETE CBC AUTOMATED: CPT

## 2024-08-15 RX ORDER — LOPERAMIDE HCL 2 MG
2 TABLET ORAL 4 TIMES DAILY PRN
Qty: 30 TABLET | Refills: 0 | Status: SHIPPED | OUTPATIENT
Start: 2024-08-15 | End: 2024-08-25

## 2024-08-15 ASSESSMENT — PATIENT HEALTH QUESTIONNAIRE - PHQ9
1. LITTLE INTEREST OR PLEASURE IN DOING THINGS: NOT AT ALL
2. FEELING DOWN, DEPRESSED OR HOPELESS: NOT AT ALL
SUM OF ALL RESPONSES TO PHQ9 QUESTIONS 1 AND 2: 0

## 2024-08-15 NOTE — PROGRESS NOTES
"Subjective:  Evangelina Braxton is a 83 y.o. male who presents to clinic today for Follow-up (1 month follow up; mood - states feeling \"so so\")      He was started on Remeron 15 mg at prior appointment.  He notes taht he has been more hungry and has been eating more. He has gained 5 lbs.  He notes that his strength has improved a bit.  He's been having diarrhea for a few weeks. He's noting diarrhea that occurs 2-3x daily. Stools are liquid, non painful, he has some stomach cramps, no blood in diarrhea, he thought it was related to dairy and it made no difference. He had this previously 2-3 years ago and was admitted for it and never got a diagnosis.     Review of Systems    Assessment/Plan:  Evangelina Braxton is a 83 y.o. male with a history of GERD, anemia, aortic stenosis, BPH, dysphagia who presents to clinic today to address the following issues:   1. Watery diarrhea  C. difficile, PCR    CBC    Comprehensive Metabolic Panel    loperamide (Imodium A-D) 2 mg tablet      2. Unintentional weight loss          Watery diarrhea - will test for cdiff, CBC, CMP and stool PCR due to combination of age as well as high risk for possible dehydration with recurrent stools.  Since he is going on a bus trip tomorrow I informed him that he could take a single dose of loperamide as needed until C. difficile testing comes back.  Other than for mustard no loperamide should be used until C. difficile is back.    Thankfully he has had weight gain since last appointment and is gained 5 pounds since starting Remeron.  He does not notice any side effects on this medication.    Problem List Items Addressed This Visit       Unintentional weight loss     Other Visit Diagnoses       Watery diarrhea    -  Primary    Relevant Medications    loperamide (Imodium A-D) 2 mg tablet    Other Relevant Orders    C. difficile, PCR    CBC    Comprehensive Metabolic Panel            There are no Patient Instructions on file for this " "visit.    Follow up: 1 month    Return precautions discussed.  An After Visit Summary was given to the patient.  All questions were answered and patient in agreement with plan.    Objective:  /62 (BP Location: Right arm, Patient Position: Sitting)   Pulse 82   Ht 1.702 m (5' 7\")   Wt 55.5 kg (122 lb 6.4 oz)   SpO2 94%   BMI 19.17 kg/m²     Physical Exam  Vitals and nursing note reviewed.   Constitutional:       General: He is not in acute distress.     Appearance: He is not ill-appearing.   HENT:      Head: Normocephalic and atraumatic.      Mouth/Throat:      Mouth: Mucous membranes are moist.   Eyes:      General: No scleral icterus.        Right eye: No discharge.         Left eye: No discharge.      Extraocular Movements: Extraocular movements intact.      Conjunctiva/sclera: Conjunctivae normal.   Cardiovascular:      Rate and Rhythm: Normal rate and regular rhythm.   Pulmonary:      Effort: Pulmonary effort is normal. No respiratory distress.      Breath sounds: Normal breath sounds.   Skin:     General: Skin is dry.   Neurological:      General: No focal deficit present.      Mental Status: He is alert and oriented to person, place, and time.   Psychiatric:         Thought Content: Thought content normal.         Judgment: Judgment normal.         I spent 19 minutes in total time for this visit including all related clinical activities before, during, and after the visit excluding other billable activities/procedure time.     Daniella Hamlin MD    "

## 2024-08-15 NOTE — TELEPHONE ENCOUNTER
Please call patient to discuss the following blood counts and kidney liver and electrolyte look good..    Once finished please close the encounter.    Thank you,  Daniella Hamlin MD

## 2024-08-15 NOTE — TELEPHONE ENCOUNTER
Please call patient to discuss the following blood counts and kidney liver and electrolyte look good..     Once finished please close the encounter.     Thank you,  Daniella Hamlin MD    LEFT MESSAGE ON VOICE MAIL FOR PATIENT TO CALL

## 2024-09-11 ENCOUNTER — ANTICOAGULATION - WARFARIN VISIT (OUTPATIENT)
Dept: PHARMACY | Facility: HOSPITAL | Age: 84
End: 2024-09-11
Payer: COMMERCIAL

## 2024-09-11 DIAGNOSIS — I48.11 LONGSTANDING PERSISTENT ATRIAL FIBRILLATION (MULTI): Primary | ICD-10-CM

## 2024-09-11 LAB
POC INR: 3
POC PROTHROMBIN TIME: NORMAL

## 2024-09-11 PROCEDURE — 99211 OFF/OP EST MAY X REQ PHY/QHP: CPT | Performed by: PHARMACIST

## 2024-09-11 PROCEDURE — 85610 PROTHROMBIN TIME: CPT | Mod: QW

## 2024-09-11 NOTE — PROGRESS NOTES
Pt enrolled in [facility] for management of Longstanding persistent atrial fibrillation (Multi) [I48.11].     Pt current location in clinic.     Current anticoagulant: Warfarin    Time since last visit: 4 weeks    Last INR: 2.7 on warfarin 10 mg in the previous week. No changes were made at that time.    Last Creatinine:   Lab Results   Component Value Date    CREATININE 1.03 08/15/2024     Last hemoglobin/hematocrit:  Lab Results   Component Value Date    HGB 13.4 (L) 08/15/2024     Lab Results   Component Value Date    HCT 43.7 08/15/2024       Current INR: 3.0 is therapeutic for goal range of 2.0-3.0 and is reflective of 10 mg in the previous week prior to visit.    Patient denies any missed doses.  Patient denies any medication changes, diet changes, or OTC/herbal supplement changes since last visit.  Patient denies any adverse reactions or barriers.  Patient denies any CP/SOB, fatigue, bleeding or bruising since last visit.   Patient denies any change in alcohol or tobacco use since last visit.   Patient denies any upcoming medical or dental procedures.    Plan:  Patient was instructed to continue with current warfarin dose.  INR follow up will occur in 4 weeks.  Patient was instructed to maintain consistent vegetable intake, to monitor for any bruising or bleeding, and to call with any medication changes or concerns.    Pt handout given with above information    Kerry Spence, PharmD  P:[phone]  F:[phone]

## 2024-10-09 ENCOUNTER — ANTICOAGULATION - WARFARIN VISIT (OUTPATIENT)
Dept: PHARMACY | Facility: HOSPITAL | Age: 84
End: 2024-10-09
Payer: COMMERCIAL

## 2024-10-09 ENCOUNTER — PHARMACY VISIT (OUTPATIENT)
Dept: PHARMACY | Facility: CLINIC | Age: 84
End: 2024-10-09
Payer: COMMERCIAL

## 2024-10-09 DIAGNOSIS — I48.11 LONGSTANDING PERSISTENT ATRIAL FIBRILLATION (MULTI): Primary | ICD-10-CM

## 2024-10-09 LAB
POC INR: 5.3
POC PROTHROMBIN TIME: NORMAL

## 2024-10-09 PROCEDURE — 85610 PROTHROMBIN TIME: CPT | Mod: QW

## 2024-10-09 PROCEDURE — 99211 OFF/OP EST MAY X REQ PHY/QHP: CPT | Performed by: PHARMACIST

## 2024-10-09 PROCEDURE — RXMED WILLOW AMBULATORY MEDICATION CHARGE

## 2024-10-09 NOTE — PROGRESS NOTES
Pt enrolled in [facility] for management of Longstanding persistent atrial fibrillation (Multi) [I48.11].     Pt current location in clinic.     Current anticoagulant: Warfarin    Time since last visit: 4 weeks    Last INR: 3.0 on warfarin 10 mg in the previous week. No changes were made at that time.    Last Creatinine:   Lab Results   Component Value Date    CREATININE 1.03 08/15/2024     Last hemoglobin/hematocrit:  Lab Results   Component Value Date    HGB 13.4 (L) 08/15/2024     Lab Results   Component Value Date    HCT 43.7 08/15/2024       Current INR: 5.3 is SUPRAtherapeutic for goal range of 2.0-3.0 and is reflective of 10 mg in the previous week prior to visit.    Dosing confirmed with patient  Patient denies any missed doses.  Patient denies any medication changes or diet changes, since last visit.  Pt notes he's been taking a lot of tylenol recently.   Pt is concerned about coming into flu season.  I recommended trying some vitamin c to boost immune system.  Pt is also considering getting the flu shot.  Patient denies any adverse reactions or barriers.  Patient denies any CP/SOB, fatigue, bleeding or bruising since last visit.   Patient denies any change in alcohol or tobacco use since last visit.   Patient denies any upcoming medical or dental procedures.    Plan:  Patient was instructed to  hold x 2 then recheck INR .  Please cut back on tylenol usage.    INR follow up will occur in 2 days.  Patient was instructed to maintain consistent vegetable intake, to monitor for any bruising or bleeding, and to call with any medication changes or concerns.    Pt handout given with above information    Kerry Spence, AdiliaD  P:[phone]  F:[phone]

## 2024-10-11 ENCOUNTER — ANTICOAGULATION - WARFARIN VISIT (OUTPATIENT)
Dept: PHARMACY | Facility: HOSPITAL | Age: 84
End: 2024-10-11
Payer: COMMERCIAL

## 2024-10-11 DIAGNOSIS — I48.11 LONGSTANDING PERSISTENT ATRIAL FIBRILLATION (MULTI): Primary | ICD-10-CM

## 2024-10-11 LAB
POC INR: 2.4
POC PROTHROMBIN TIME: NORMAL

## 2024-10-11 PROCEDURE — 85610 PROTHROMBIN TIME: CPT | Mod: QW

## 2024-10-11 NOTE — PROGRESS NOTES
Pt enrolled in [facility] for management of Longstanding persistent atrial fibrillation (Multi) [I48.11].     Pt current location in clinic.     Current anticoagulant: Warfarin    Time since last visit: 3 days    Last INR: 5.3 on warfarin 70 mg in the previous week. Pt was instructed to hold warfarin for 2 days then recheck INR at last visit.    Last Creatinine:   Lab Results   Component Value Date    CREATININE 1.03 08/15/2024     Last hemoglobin/hematocrit:  Lab Results   Component Value Date    HGB 13.4 (L) 08/15/2024     Lab Results   Component Value Date    HCT 43.7 08/15/2024       Current INR: 2.4 is therapeutic for goal range of 2.0-3.0 and is reflective of 7 mg in the previous week prior to visit.    Dosing confirmed with patient  Patient denies any missed doses.  Patient denies any medication changes, diet changes, or herbal supplement changes since last visit - has stopped taking acetaminophen.  Patient denies any adverse reactions or barriers.  Patient denies any CP/SOB, fatigue, bleeding or bruising since last visit.   Patient denies any change in alcohol or tobacco use since last visit.   Patient denies any upcoming medical or dental procedures.    Plan:  Patient was instructed to resume usual dosing, limit acetaminophen .  INR follow up will occur in 1.5 weeks.  Patient was instructed to maintain consistent vegetable intake, to monitor for any bruising or bleeding, and to call with any medication changes or concerns.    Pt handout given with above information    Augie Biswas, PharmD  P:[phone]  F:[phone]

## 2024-10-23 ENCOUNTER — ANTICOAGULATION - WARFARIN VISIT (OUTPATIENT)
Dept: PHARMACY | Facility: HOSPITAL | Age: 84
End: 2024-10-23
Payer: COMMERCIAL

## 2024-10-23 DIAGNOSIS — I48.11 LONGSTANDING PERSISTENT ATRIAL FIBRILLATION (MULTI): Primary | ICD-10-CM

## 2024-10-23 LAB
POC INR: 2.3
POC PROTHROMBIN TIME: NORMAL

## 2024-10-23 PROCEDURE — 85610 PROTHROMBIN TIME: CPT | Mod: QW

## 2024-10-23 PROCEDURE — 99211 OFF/OP EST MAY X REQ PHY/QHP: CPT | Performed by: PHARMACIST

## 2024-10-23 NOTE — PROGRESS NOTES
Pt enrolled in [facility] for management of Longstanding persistent atrial fibrillation (Multi) [I48.11].     Pt current location in clinic.     Current anticoagulant: Warfarin    Time since last visit: 2 weeks    Last INR: 2.4 on warfarin 7 mg in the previous week. Pt was instructed to resume usual dosing of 10mg weekly at last visit.    Last Creatinine:   Lab Results   Component Value Date    CREATININE 1.03 08/15/2024     Last hemoglobin/hematocrit:  Lab Results   Component Value Date    HGB 13.4 (L) 08/15/2024     Lab Results   Component Value Date    HCT 43.7 08/15/2024       Current INR: 2.3 is therapeutic for goal range of 2.0-3.0 and is reflective of 10 mg in the previous week prior to visit.    Dosing confirmed with patient  Patient denies any missed doses.  Patient denies any medication changes, diet changes, or OTC/herbal supplement changes since last visit.  Patient denies any adverse reactions or barriers.  Patient denies any CP/SOB, fatigue, bleeding or bruising since last visit.   Patient denies any change in alcohol or tobacco use since last visit.   Patient denies any upcoming medical or dental procedures.    Plan:  Patient was instructed to continue with current warfarin dose.  INR follow up will occur in 4 weeks.  Patient was instructed to maintain consistent vegetable intake, to monitor for any bruising or bleeding, and to call with any medication changes or concerns.    Pt handout given with above information    Augie Biswas, PharmD  P:[phone]  F:[phone]

## 2024-10-30 DIAGNOSIS — I48.11 LONGSTANDING PERSISTENT ATRIAL FIBRILLATION (MULTI): ICD-10-CM

## 2024-10-30 RX ORDER — PANTOPRAZOLE SODIUM 40 MG/1
40 TABLET, DELAYED RELEASE ORAL 2 TIMES DAILY
Qty: 180 TABLET | Refills: 1 | Status: SHIPPED | OUTPATIENT
Start: 2024-10-30

## 2024-11-04 DIAGNOSIS — J44.9 OBSTRUCTIVE LUNG DISEASE (MULTI): ICD-10-CM

## 2024-11-04 RX ORDER — MONTELUKAST SODIUM 10 MG/1
10 TABLET ORAL NIGHTLY
Qty: 90 TABLET | Refills: 1 | Status: SHIPPED | OUTPATIENT
Start: 2024-11-04

## 2024-11-20 ENCOUNTER — ANTICOAGULATION - WARFARIN VISIT (OUTPATIENT)
Dept: PHARMACY | Facility: HOSPITAL | Age: 84
End: 2024-11-20
Payer: COMMERCIAL

## 2024-11-20 DIAGNOSIS — I48.11 LONGSTANDING PERSISTENT ATRIAL FIBRILLATION (MULTI): Primary | ICD-10-CM

## 2024-11-20 LAB
POC INR: 2.6
POC PROTHROMBIN TIME: NORMAL

## 2024-11-20 PROCEDURE — 99211 OFF/OP EST MAY X REQ PHY/QHP: CPT

## 2024-11-20 PROCEDURE — 85610 PROTHROMBIN TIME: CPT | Mod: QW

## 2024-11-20 NOTE — PROGRESS NOTES
Pt enrolled in [facility] for management of Longstanding persistent atrial fibrillation (Multi) [I48.11].     Pt current location in clinic.     Current anticoagulant: Warfarin    Time since last visit: 4 weeks    Last INR: 2.3 on warfarin 10 mg in the previous week. No changes were made at that time.    Last Creatinine:   Lab Results   Component Value Date    CREATININE 1.03 08/15/2024     Last hemoglobin/hematocrit:  Lab Results   Component Value Date    HGB 13.4 (L) 08/15/2024     Lab Results   Component Value Date    HCT 43.7 08/15/2024       Current INR: 2.6 is therapeutic for goal range of 2.0-3.0 and is reflective of 10 mg in the previous week prior to visit.    Dosing confirmed with patient  Patient denies any missed doses.  Patient denies any medication changes, diet changes, or OTC/herbal supplement changes since last visit.  Patient denies any adverse reactions or barriers.  Patient denies any CP/SOB, fatigue, bleeding or bruising since last visit.   Patient denies any change in alcohol or tobacco use since last visit.   Patient denies any upcoming medical or dental procedures.    Plan:  Patient was instructed to continue with current warfarin dose.  INR follow up will occur in 4 weeks.  Patient was instructed to maintain consistent vegetable intake, to monitor for any bruising or bleeding, and to call with any medication changes or concerns.    Pt handout given with above information    Alex Sol, PharmD  P:[phone]  F:[phone]

## 2024-11-25 ENCOUNTER — APPOINTMENT (OUTPATIENT)
Age: 84
End: 2024-11-25
Payer: COMMERCIAL

## 2024-12-17 ENCOUNTER — APPOINTMENT (OUTPATIENT)
Age: 84
End: 2024-12-17
Payer: COMMERCIAL

## 2024-12-18 ENCOUNTER — ANTICOAGULATION - WARFARIN VISIT (OUTPATIENT)
Dept: PHARMACY | Facility: HOSPITAL | Age: 84
End: 2024-12-18
Payer: COMMERCIAL

## 2024-12-18 DIAGNOSIS — I48.11 LONGSTANDING PERSISTENT ATRIAL FIBRILLATION (MULTI): Primary | ICD-10-CM

## 2024-12-18 LAB
POC INR: 2.6
POC PROTHROMBIN TIME: NORMAL

## 2024-12-18 PROCEDURE — 99211 OFF/OP EST MAY X REQ PHY/QHP: CPT

## 2024-12-18 PROCEDURE — 85610 PROTHROMBIN TIME: CPT | Mod: QW

## 2024-12-18 NOTE — PROGRESS NOTES
Pt enrolled in [facility] for management of Longstanding persistent atrial fibrillation (Multi) [I48.11].     Pt current location in clinic.     Current anticoagulant: Warfarin    Time since last visit: 4 weeks    Last INR: 2.6 on warfarin 10 mg in the previous week. No changes were made at that time.    Last Creatinine:   Lab Results   Component Value Date    CREATININE 1.03 08/15/2024     Last hemoglobin/hematocrit:  Lab Results   Component Value Date    HGB 13.4 (L) 08/15/2024     Lab Results   Component Value Date    HCT 43.7 08/15/2024       Current INR: 2.6 is therapeutic for goal range of 2.0-3.0 and is reflective of 10 mg in the previous week prior to visit.    Dosing confirmed with patient  Patient denies any missed doses.  Patient denies any medication changes, diet changes, or OTC/herbal supplement changes since last visit.  Patient denies any adverse reactions or barriers.  Patient denies any CP/SOB, fatigue, bleeding or bruising since last visit.   Patient denies any change in alcohol or tobacco use since last visit.   Patient denies any upcoming medical or dental procedures.    Plan:  Patient was instructed to continue with current warfarin dose.  INR follow up will occur in 4 weeks.  Patient was instructed to maintain consistent vegetable intake, to monitor for any bruising or bleeding, and to call with any medication changes or concerns.    Pt handout given with above information    Renato Lester, PharmD  P:[phone]  F:[phone]

## 2024-12-31 ENCOUNTER — OFFICE VISIT (OUTPATIENT)
Dept: URGENT CARE | Facility: CLINIC | Age: 84
End: 2024-12-31
Payer: COMMERCIAL

## 2024-12-31 VITALS
HEIGHT: 67 IN | HEART RATE: 87 BPM | OXYGEN SATURATION: 97 % | BODY MASS INDEX: 19.15 KG/M2 | SYSTOLIC BLOOD PRESSURE: 125 MMHG | RESPIRATION RATE: 18 BRPM | WEIGHT: 122 LBS | DIASTOLIC BLOOD PRESSURE: 66 MMHG | TEMPERATURE: 97.8 F

## 2024-12-31 DIAGNOSIS — J20.9 ACUTE BRONCHITIS, UNSPECIFIED ORGANISM: Primary | ICD-10-CM

## 2024-12-31 PROCEDURE — 99213 OFFICE O/P EST LOW 20 MIN: CPT | Performed by: NURSE PRACTITIONER

## 2024-12-31 RX ORDER — PREDNISONE 20 MG/1
20 TABLET ORAL DAILY
Qty: 5 TABLET | Refills: 0 | Status: SHIPPED | OUTPATIENT
Start: 2024-12-31 | End: 2025-01-05

## 2024-12-31 RX ORDER — AZITHROMYCIN 250 MG/1
TABLET, FILM COATED ORAL
Qty: 6 TABLET | Refills: 0 | Status: SHIPPED | OUTPATIENT
Start: 2024-12-31 | End: 2025-01-05

## 2024-12-31 NOTE — PROGRESS NOTES
84 y.o. male presents for evaluation of nasal congestion, cough and sore throat for the past 3 days.  States he feels that he develops the symptoms every time there is a change in weather.  Denies fever, chest pains, nausea, vomiting, diarrhea, body aches, fatigue, abdominal pain or any other associated symptom or complaint.  No known ill contacts.  Patient specifically requests a Z-Abram and prednisone as he states this usually takes care of his symptoms.  He has tolerated in the past before with use of Coumadin and history of A-fib.  Patient declines viral testing today.      Vitals:    24 1051   BP: 125/66   Pulse: 87   Resp: 18   Temp: 36.6 °C (97.8 °F)   SpO2: 97%       Allergies   Allergen Reactions    Doxycycline Other    Sulfamethoxazole-Trimethoprim Other and Unknown     Nausea and vomiting       Medication Documentation Review Audit       Reviewed by Jacquie Ascencio MA (Medical Assistant) on 24 at 1050      Medication Order Taking? Sig Documenting Provider Last Dose Status   aspirin 81 mg chewable tablet [id number] Yes Chew 1 tablet (81 mg) once daily. Historical Provider, MD  Active   azelastine (Astelin) 137 mcg (0.1 %) nasal spray [id number]  Administer 2 sprays into each nostril 2 times a day for 15 days. Use in each nostril as directed Talon Zacarias PA-C   24 4209   dilTIAZem CD (Cardizem CD) 180 mg 24 hr capsule [id number] Yes Take 1 capsule (180 mg) by mouth once daily. Florecita Carlisle, DO  Active   flu vacc [id number], 65yr up,-PF (Fluzone High-Dose Quad) syringe [id number] Yes Inject into the muscle. Walt Dugan, DO  Active   gabapentin (Neurontin) 100 mg capsule [id number] Yes Take 1 capsule (100 mg) by mouth once daily at bedtime. Historical Provider, MD  Active   Lactobacillus acidophilus (PROBIOTIC ORAL) [id number] Yes Take by mouth. Historical Provider, MD  Active   metoprolol tartrate (Lopressor) 25 mg tablet [id number] Yes Take 1 tablet (25 mg) by mouth 2 times a  day. Daniella Hamlin MD  Active   mirtazapine (Remeron) 15 mg tablet [id number]  Take 1 tablet (15 mg) by mouth once daily at bedtime. Daniella Hamlin MD   24 2359   montelukast (Singulair) 10 mg tablet [id number] Yes Take 1 tablet (10 mg) by mouth once daily at bedtime. Jessica Tobar DO  Active   pantoprazole (ProtoNix) 40 mg EC tablet [id number] Yes Take 1 tablet (40 mg) by mouth 2 times a day. Daniella Hamlin MD  Active   tamsulosin (Flomax) 0.4 mg 24 hr capsule [id number] Yes Take 1 capsule (0.4 mg) by mouth once daily. Daniella Hamlin MD  Active   warfarin (Coumadin) 2 mg tablet [id number] Yes take 1 tablet by mouth once daily or as directed BY INR Florecita Carlisle DO  Active                    Past Medical History:   Diagnosis Date    Laceration without foreign body of pharynx and cervical esophagus, initial encounter     Tear of esophagus    Personal history of diseases of the skin and subcutaneous tissue 10/15/2019    History of pressure injury of skin    Personal history of other diseases of the circulatory system     H/O supraventricular tachycardia    Personal history of other diseases of the digestive system     History of hematemesis    Personal history of other diseases of the digestive system     History of Cabello's esophagus    Personal history of pneumonia (recurrent)     History of pneumonia       Past Surgical History:   Procedure Laterality Date    OTHER SURGICAL HISTORY  2019    Paraesophageal hiatal hernia repair       ROS  See HPI    Physical Exam  Vitals and nursing note reviewed.   Constitutional:       General: He is not in acute distress.     Appearance: Normal appearance. He is not ill-appearing, toxic-appearing or diaphoretic.   HENT:      Head: Normocephalic and atraumatic.      Right Ear: Tympanic membrane, ear canal and external ear normal.      Left Ear: Tympanic membrane, ear canal and external ear normal.      Nose: Congestion present.      Mouth/Throat:       Mouth: Mucous membranes are moist.      Pharynx: Posterior oropharyngeal erythema (mild) present. No oropharyngeal exudate.   Eyes:      Extraocular Movements: Extraocular movements intact.      Conjunctiva/sclera: Conjunctivae normal.      Pupils: Pupils are equal, round, and reactive to light.   Cardiovascular:      Rate and Rhythm: Normal rate. Rhythm irregular.   Pulmonary:      Effort: Pulmonary effort is normal.      Breath sounds: Normal breath sounds.   Lymphadenopathy:      Cervical: Cervical adenopathy present.   Skin:     General: Skin is warm.   Neurological:      General: No focal deficit present.      Mental Status: He is alert and oriented to person, place, and time.   Psychiatric:         Mood and Affect: Mood normal.         Behavior: Behavior normal.           Assessment/Plan/MDM  Evangelina was seen today for uri.  Diagnoses and all orders for this visit:  Acute bronchitis, unspecified organism (Primary)  -     azithromycin (Zithromax Z-Abram) 250 mg tablet; Take 2 tablets (500 mg) on  Day 1,  followed by 1 tablet (250 mg) once daily on Days 2 through 5.  -     predniSONE (Deltasone) 20 mg tablet; Take 1 tablet (20 mg) by mouth once daily for 5 days.    Patient declines viral testing today and requests Zithromax and prednisone as he has tolerated this in the past several times.  I am agreeable to this.  Encouraged him to follow-up with Coumadin clinic and monitor heart rate.  Encouraged pt to use otc cold remedies PRN, push PO fluids and rest. Patient's clinical presentation is otherwise unremarkable at this time. Patient is discharged with instructions to follow-up with primary care or seek emergency medical attention for worsening symptoms or any new concerns.    I did personally review Evangelina's past medical history, surgical history, social history, as well as family history (when relevant).  In this case, I also oversaw the his drug management by reviewing his medication list, allergy list, as well  as the medications that I prescribed during the UC course and/or recommended as an out-patient (including possible OTC medications such as acetaminophen, NSAIDs , etc).    After reviewing the items above, I did look at previous medical documentation, such as recent hospitalizations, office visits, and/or recent consultations with PCP/specialist.                          SDOH:   Another factor that I considered in Evangelina's care was his Social Determinants of Health (SDOH). During this UC encounter, he did not have social determinants of health. Those SDOH influencing Evangelina's care are: none      Atilio Spence CNP  [facility]  [phone]

## 2025-01-06 ENCOUNTER — APPOINTMENT (OUTPATIENT)
Dept: PHARMACY | Facility: HOSPITAL | Age: 85
End: 2025-01-06
Payer: MEDICARE

## 2025-01-07 ENCOUNTER — ANTICOAGULATION - WARFARIN VISIT (OUTPATIENT)
Dept: PHARMACY | Facility: HOSPITAL | Age: 85
End: 2025-01-07
Payer: MEDICARE

## 2025-01-07 DIAGNOSIS — I48.11 LONGSTANDING PERSISTENT ATRIAL FIBRILLATION (MULTI): Primary | ICD-10-CM

## 2025-01-07 LAB
POC INR: 2.3
POC PROTHROMBIN TIME: NORMAL

## 2025-01-07 PROCEDURE — 85610 PROTHROMBIN TIME: CPT | Mod: QW

## 2025-01-07 PROCEDURE — 99211 OFF/OP EST MAY X REQ PHY/QHP: CPT | Performed by: PHARMACIST

## 2025-01-07 NOTE — PROGRESS NOTES
Pt enrolled in [facility] for management of Longstanding persistent atrial fibrillation (Multi) [I48.11].     Pt current location in clinic.     Current anticoagulant: Warfarin    Time since last visit: 3 weeks    Last INR: 2.6 on warfarin 10 mg in the previous week. No changes were made at that time.    Last Creatinine:   Lab Results   Component Value Date    CREATININE 1.03 08/15/2024     Last hemoglobin/hematocrit:  Lab Results   Component Value Date    HGB 13.4 (L) 08/15/2024     Lab Results   Component Value Date    HCT 43.7 08/15/2024       Current INR: 2.3 is therapeutic for goal range of 2.0-3.0 and is reflective of 10 mg in the previous week prior to visit.    Dosing confirmed with patient  Patient denies any missed doses.  Patient denies diet changes, or OTC/herbal supplement changes since last visit. Patient reported finishing a zpak and prednisone regimen on 1/4.  Patient denies any adverse reactions or barriers.  Patient denies any CP/SOB, fatigue, bleeding or bruising since last visit.   Patient denies any change in alcohol or tobacco use since last visit.   Patient denies any upcoming medical or dental procedures. Dental cleaning in February,unsure of date. Asked he double check date and see if the dentist wants any holding of warfarin to occur.    Plan:  Patient was instructed to continue with current warfarin dose.  INR follow up will occur in 4 weeks.  Patient was instructed to maintain consistent vegetable intake, to monitor for any bruising or bleeding, and to call with any medication changes or concerns.    Pt handout given with above information    Ceferino Damon, Anil  P:[phone]  F:[phone]

## 2025-01-09 ENCOUNTER — APPOINTMENT (OUTPATIENT)
Age: 85
End: 2025-01-09
Payer: COMMERCIAL

## 2025-01-09 VITALS
BODY MASS INDEX: 18.55 KG/M2 | OXYGEN SATURATION: 98 % | HEART RATE: 72 BPM | WEIGHT: 118.2 LBS | SYSTOLIC BLOOD PRESSURE: 116 MMHG | HEIGHT: 67 IN | DIASTOLIC BLOOD PRESSURE: 58 MMHG

## 2025-01-09 DIAGNOSIS — F33.9 DEPRESSION, RECURRENT (CMS-HCC): Primary | ICD-10-CM

## 2025-01-09 DIAGNOSIS — R63.4 UNINTENTIONAL WEIGHT LOSS: ICD-10-CM

## 2025-01-09 DIAGNOSIS — Z23 NEED FOR DIPHTHERIA-TETANUS-PERTUSSIS (TDAP) VACCINE: ICD-10-CM

## 2025-01-09 DIAGNOSIS — I48.11 LONGSTANDING PERSISTENT ATRIAL FIBRILLATION (MULTI): ICD-10-CM

## 2025-01-09 PROCEDURE — 3078F DIAST BP <80 MM HG: CPT | Performed by: STUDENT IN AN ORGANIZED HEALTH CARE EDUCATION/TRAINING PROGRAM

## 2025-01-09 PROCEDURE — G2211 COMPLEX E/M VISIT ADD ON: HCPCS | Performed by: STUDENT IN AN ORGANIZED HEALTH CARE EDUCATION/TRAINING PROGRAM

## 2025-01-09 PROCEDURE — 99214 OFFICE O/P EST MOD 30 MIN: CPT | Performed by: STUDENT IN AN ORGANIZED HEALTH CARE EDUCATION/TRAINING PROGRAM

## 2025-01-09 PROCEDURE — 1159F MED LIST DOCD IN RCRD: CPT | Performed by: STUDENT IN AN ORGANIZED HEALTH CARE EDUCATION/TRAINING PROGRAM

## 2025-01-09 PROCEDURE — 90471 IMMUNIZATION ADMIN: CPT | Performed by: STUDENT IN AN ORGANIZED HEALTH CARE EDUCATION/TRAINING PROGRAM

## 2025-01-09 PROCEDURE — 1160F RVW MEDS BY RX/DR IN RCRD: CPT | Performed by: STUDENT IN AN ORGANIZED HEALTH CARE EDUCATION/TRAINING PROGRAM

## 2025-01-09 PROCEDURE — 3074F SYST BP LT 130 MM HG: CPT | Performed by: STUDENT IN AN ORGANIZED HEALTH CARE EDUCATION/TRAINING PROGRAM

## 2025-01-09 PROCEDURE — 1036F TOBACCO NON-USER: CPT | Performed by: STUDENT IN AN ORGANIZED HEALTH CARE EDUCATION/TRAINING PROGRAM

## 2025-01-09 PROCEDURE — 90715 TDAP VACCINE 7 YRS/> IM: CPT | Performed by: STUDENT IN AN ORGANIZED HEALTH CARE EDUCATION/TRAINING PROGRAM

## 2025-01-09 PROCEDURE — 1157F ADVNC CARE PLAN IN RCRD: CPT | Performed by: STUDENT IN AN ORGANIZED HEALTH CARE EDUCATION/TRAINING PROGRAM

## 2025-01-09 RX ORDER — MIRTAZAPINE 15 MG/1
15 TABLET, FILM COATED ORAL NIGHTLY
Qty: 30 TABLET | Refills: 3 | Status: SHIPPED | OUTPATIENT
Start: 2025-01-09 | End: 2025-07-08

## 2025-01-09 RX ORDER — DILTIAZEM HYDROCHLORIDE 180 MG/1
180 CAPSULE, COATED, EXTENDED RELEASE ORAL DAILY
Qty: 90 CAPSULE | Refills: 2 | Status: SHIPPED | OUTPATIENT
Start: 2025-01-09

## 2025-01-09 ASSESSMENT — PATIENT HEALTH QUESTIONNAIRE - PHQ9
2. FEELING DOWN, DEPRESSED OR HOPELESS: SEVERAL DAYS
SUM OF ALL RESPONSES TO PHQ9 QUESTIONS 1 AND 2: 1
1. LITTLE INTEREST OR PLEASURE IN DOING THINGS: NOT AT ALL
10. IF YOU CHECKED OFF ANY PROBLEMS, HOW DIFFICULT HAVE THESE PROBLEMS MADE IT FOR YOU TO DO YOUR WORK, TAKE CARE OF THINGS AT HOME, OR GET ALONG WITH OTHER PEOPLE: SOMEWHAT DIFFICULT

## 2025-01-09 NOTE — PROGRESS NOTES
Subjective:  Evangelina Braxton is a 84 y.o. male who presents to clinic today for Follow-up (3 MO FU/ URGENT CARE FU [facility])      He notes that he's trying to eat more. He feels okay. He's not weak and falling down. He's able to do everything he wants. [facility]. He's doing bus trips and getting out. His energy has been okay. His swallowing is okay. Better since his EGD.     He was seen at urgent care 12/31/24 and treated with azithromycin and prednisone.     He plays cards with a 93 year old woman. He notes that the weather has him bummed out. He had good holidays.     Review of Systems    Assessment/Plan:  Evangelina Braxton is a 84 y.o. male with a history of  GERD, anemia, aortic stenosis, BPH, dysphagia  who presents to clinic today to address the following issues:   1. Depression, recurrent (CMS-HCC)  mirtazapine (Remeron) 15 mg tablet      2. Unintentional weight loss  mirtazapine (Remeron) 15 mg tablet      3. Longstanding persistent atrial fibrillation (Multi)  dilTIAZem CD (Cardizem CD) 180 mg 24 hr capsule      4. Need for diphtheria-tetanus-pertussis (Tdap) vaccine  Tdap vaccine, age 7 years and older  (BOOSTRIX)        I encouraged Waldemar to confirm that he is taking his mirtazapine as he thinks he might not be taking it.  He notes that he still having some difficulty with mood and appetite.    Cardizem refilled.     Overall doing well. No needed changes.  Problem List Items Addressed This Visit       Unintentional weight loss    Relevant Medications    mirtazapine (Remeron) 15 mg tablet    Longstanding persistent atrial fibrillation (Multi)    Relevant Medications    dilTIAZem CD (Cardizem CD) 180 mg 24 hr capsule    Depression, recurrent (CMS-HCC) - Primary    Overview     Chronic depression with poor social outlets and a lot of loneliness. Started mirtazepine May 2024.         Relevant Medications    mirtazapine (Remeron) 15 mg tablet     Other Visit  "Diagnoses       Need for diphtheria-tetanus-pertussis (Tdap) vaccine        Relevant Orders    Tdap vaccine, age 7 years and older  (BOOSTRIX) (Completed)            Patient Instructions   Get your RSV Vaccine at the hospital.     Follow up: 4 months    Return precautions discussed.  An After Visit Summary was given to the patient.  All questions were answered and patient in agreement with plan.    Objective:  /58   Pulse 72   Ht 1.702 m (5' 7\")   Wt 53.6 kg (118 lb 3.2 oz)   SpO2 98%   BMI 18.51 kg/m²     Physical Exam  Vitals and nursing note reviewed.   Constitutional:       General: He is not in acute distress.     Appearance: He is not ill-appearing.   HENT:      Head: Normocephalic and atraumatic.      Mouth/Throat:      Mouth: Mucous membranes are moist.   Eyes:      General: No scleral icterus.        Right eye: No discharge.         Left eye: No discharge.      Extraocular Movements: Extraocular movements intact.      Conjunctiva/sclera: Conjunctivae normal.   Cardiovascular:      Rate and Rhythm: Normal rate and regular rhythm.   Pulmonary:      Effort: Pulmonary effort is normal. No respiratory distress.      Breath sounds: Normal breath sounds.   Musculoskeletal:      Right lower leg: No edema.      Left lower leg: No edema.   Skin:     General: Skin is dry.   Neurological:      General: No focal deficit present.      Mental Status: He is alert and oriented to person, place, and time.   Psychiatric:         Thought Content: Thought content normal.         Judgment: Judgment normal.         I spent 13 minutes in total time for this visit including all related clinical activities before, during, and after the visit excluding other billable activities/procedure time.     Daniella Hamlin MD    "

## 2025-01-21 DIAGNOSIS — I48.11 LONGSTANDING PERSISTENT ATRIAL FIBRILLATION (MULTI): ICD-10-CM

## 2025-01-21 RX ORDER — WARFARIN 2 MG/1
TABLET ORAL
Qty: 90 TABLET | Refills: 1 | OUTPATIENT
Start: 2025-01-21

## 2025-02-04 ENCOUNTER — ANTICOAGULATION - WARFARIN VISIT (OUTPATIENT)
Dept: PHARMACY | Facility: HOSPITAL | Age: 85
End: 2025-02-04
Payer: COMMERCIAL

## 2025-02-04 DIAGNOSIS — I48.11 LONGSTANDING PERSISTENT ATRIAL FIBRILLATION (MULTI): Primary | ICD-10-CM

## 2025-02-04 LAB
POC INR: 2.7
POC PROTHROMBIN TIME: NORMAL

## 2025-02-04 PROCEDURE — 85610 PROTHROMBIN TIME: CPT | Mod: QW

## 2025-02-04 PROCEDURE — 99211 OFF/OP EST MAY X REQ PHY/QHP: CPT | Performed by: PHARMACIST

## 2025-02-04 NOTE — PROGRESS NOTES
Pt enrolled in [facility] for management of Longstanding persistent atrial fibrillation (Multi) [I48.11].     Pt current location in clinic.     Current anticoagulant: Warfarin    Time since last visit: 4 weeks    Last INR: 2.3 on warfarin 10 mg in the previous week. No changes were made at that time.    Last Creatinine:   Lab Results   Component Value Date    CREATININE 1.03 08/15/2024     Last hemoglobin/hematocrit:  Lab Results   Component Value Date    HGB 13.4 (L) 08/15/2024     Lab Results   Component Value Date    HCT 43.7 08/15/2024       Current INR: 2.7 is therapeutic for goal range of 2.0-3.0 and is reflective of 10 mg in the previous week prior to visit.    Dosing confirmed with patient  Patient denies any missed doses.  Patient denies any medication changes, diet changes, or OTC/herbal supplement changes since last visit.  Patient denies any adverse reactions or barriers.  Patient denies any CP/SOB, fatigue, bleeding or bruising since last visit.   Patient denies any change in alcohol or tobacco use since last visit.   Patient denies any upcoming medical or dental procedures.    Plan:  Patient was instructed to continue with current warfarin dose.  INR follow up will occur in 4 weeks.  Patient was instructed to maintain consistent vegetable intake, to monitor for any bruising or bleeding, and to call with any medication changes or concerns.    Pt handout given with above information    Krery Spence, PharmD  P:[phone]  F:[phone]

## 2025-02-25 DIAGNOSIS — I48.11 LONGSTANDING PERSISTENT ATRIAL FIBRILLATION (MULTI): ICD-10-CM

## 2025-03-03 ENCOUNTER — ANTICOAGULATION - WARFARIN VISIT (OUTPATIENT)
Dept: PHARMACY | Facility: HOSPITAL | Age: 85
End: 2025-03-03
Payer: COMMERCIAL

## 2025-03-03 DIAGNOSIS — I48.11 LONGSTANDING PERSISTENT ATRIAL FIBRILLATION (MULTI): Primary | ICD-10-CM

## 2025-03-03 LAB
POC INR: 1.9
POC PROTHROMBIN TIME: NORMAL

## 2025-03-03 PROCEDURE — 99211 OFF/OP EST MAY X REQ PHY/QHP: CPT | Performed by: PHARMACIST

## 2025-03-03 PROCEDURE — 85610 PROTHROMBIN TIME: CPT | Mod: QW

## 2025-03-03 NOTE — PROGRESS NOTES
Pt enrolled in [facility] for management of Longstanding persistent atrial fibrillation (Multi) [I48.11].     Pt current location in clinic.     Current anticoagulant: Warfarin    Time since last visit: 4 weeks    Last INR: 2.7 on warfarin 10 mg in the previous week. No changes were made at that time.    Last Creatinine:   Lab Results   Component Value Date    CREATININE 1.03 08/15/2024     Last hemoglobin/hematocrit:  Lab Results   Component Value Date    HGB 13.4 (L) 08/15/2024     Lab Results   Component Value Date    HCT 43.7 08/15/2024       Current INR: 1.9 is therapeutic for goal range of 2.0-3.0 and is reflective of 10 mg in the previous week prior to visit.    Dosing confirmed with patient  Patient denies any missed doses.  Patient denies any medication changes, diet changes, or OTC/herbal supplement changes since last visit.  Patient denies any adverse reactions or barriers.  Patient denies any CP/SOB, fatigue, bleeding or bruising since last visit.   Patient denies any change in alcohol or tobacco use since last visit.   Patient denies any upcoming medical or dental procedures.    Plan:  Patient was instructed to take 2mg today, then resume usual dosing.  INR follow up will occur in 3 weeks.  Patient was instructed to maintain consistent vegetable intake, to monitor for any bruising or bleeding, and to call with any medication changes or concerns.    Pt handout given with above information    Augie Biswas, PharmD  P:[phone]  F:[phone]

## 2025-03-17 DIAGNOSIS — I48.11 LONGSTANDING PERSISTENT ATRIAL FIBRILLATION (MULTI): ICD-10-CM

## 2025-03-17 RX ORDER — WARFARIN 2 MG/1
TABLET ORAL
Qty: 90 TABLET | Refills: 1 | Status: SHIPPED | OUTPATIENT
Start: 2025-03-17

## 2025-03-24 ENCOUNTER — ANTICOAGULATION - WARFARIN VISIT (OUTPATIENT)
Dept: PHARMACY | Facility: HOSPITAL | Age: 85
End: 2025-03-24
Payer: COMMERCIAL

## 2025-03-24 DIAGNOSIS — I48.11 LONGSTANDING PERSISTENT ATRIAL FIBRILLATION (MULTI): Primary | ICD-10-CM

## 2025-03-24 LAB
POC INR: 2.1
POC PROTHROMBIN TIME: NORMAL

## 2025-03-24 PROCEDURE — 99211 OFF/OP EST MAY X REQ PHY/QHP: CPT | Performed by: PHARMACIST

## 2025-03-24 PROCEDURE — 85610 PROTHROMBIN TIME: CPT | Mod: QW

## 2025-03-24 NOTE — PROGRESS NOTES
Pt enrolled in [facility] for management of Longstanding persistent atrial fibrillation (Multi) [I48.11].     Pt current location in clinic.     Current anticoagulant: Warfarin    Time since last visit: 3 weeks    Last INR: 1.9 on warfarin 10 mg in the previous week. Pt was instructed to take 2mg once, then resume usual dosing at last visit.    Last Creatinine:   Lab Results   Component Value Date    CREATININE 1.03 08/15/2024     Last hemoglobin/hematocrit:  Lab Results   Component Value Date    HGB 13.4 (L) 08/15/2024     Lab Results   Component Value Date    HCT 43.7 08/15/2024       Current INR: 2.1 is therapeutic for goal range of 2.0-3.0 and is reflective of 10 mg in the previous week prior to visit.    Dosing confirmed with patient  Patient denies any missed doses.  Patient denies any medication changes, diet changes, or OTC/herbal supplement changes since last visit.  Patient denies any adverse reactions or barriers.  Patient denies any CP/SOB, fatigue, bleeding or bruising since last visit.   Patient denies any change in alcohol or tobacco use since last visit.   Patient denies any upcoming medical or dental procedures.    Plan:  Patient was instructed to continue with current warfarin dose.  INR follow up will occur in 4 weeks.  Patient was instructed to maintain consistent vegetable intake, to monitor for any bruising or bleeding, and to call with any medication changes or concerns.    Pt handout given with above information    Augie Biswas, PharmD  P:[phone]  F:[phone]

## 2025-03-27 ENCOUNTER — OFFICE VISIT (OUTPATIENT)
Dept: URGENT CARE | Facility: CLINIC | Age: 85
End: 2025-03-27
Payer: COMMERCIAL

## 2025-03-27 VITALS
BODY MASS INDEX: 18.52 KG/M2 | WEIGHT: 118 LBS | HEIGHT: 67 IN | TEMPERATURE: 97.3 F | RESPIRATION RATE: 18 BRPM | DIASTOLIC BLOOD PRESSURE: 67 MMHG | SYSTOLIC BLOOD PRESSURE: 157 MMHG | OXYGEN SATURATION: 95 % | HEART RATE: 89 BPM

## 2025-03-27 DIAGNOSIS — J20.9 ACUTE BRONCHITIS, UNSPECIFIED ORGANISM: Primary | ICD-10-CM

## 2025-03-27 PROCEDURE — 99213 OFFICE O/P EST LOW 20 MIN: CPT | Performed by: NURSE PRACTITIONER

## 2025-03-27 RX ORDER — AZITHROMYCIN 250 MG/1
TABLET, FILM COATED ORAL
Qty: 6 TABLET | Refills: 0 | Status: SHIPPED | OUTPATIENT
Start: 2025-03-27 | End: 2025-04-01

## 2025-03-27 RX ORDER — PREDNISONE 20 MG/1
20 TABLET ORAL DAILY
Qty: 5 TABLET | Refills: 0 | Status: SHIPPED | OUTPATIENT
Start: 2025-03-27 | End: 2025-04-01

## 2025-03-27 NOTE — PROGRESS NOTES
84 y.o. male84 y.o. male presents for evaluation of nasal congestion, cough and sore throat for the past 3 days.  States he feels that he develops the symptoms every time there is a change in weather.  Denies fever, chest pains, nausea, vomiting, diarrhea, body aches, fatigue, abdominal pain or any other associated symptom or complaint.  No known ill contacts.  Patient specifically requests a Z-Abram and prednisone as he states this usually takes care of his symptoms.  He has tolerated in the past before with use of Coumadin and history of A-fib.  Patient declines viral testing today.       Vitals:    03/27/25 1707   BP: 157/67   Pulse: 89   Resp: 18   Temp: 36.3 °C (97.3 °F)   SpO2: 95%       Allergies   Allergen Reactions    Doxycycline Other    Sulfamethoxazole-Trimethoprim Other and Unknown     Nausea and vomiting       Medication Documentation Review Audit       Reviewed by ANN Banegas (Nurse Practitioner) on 03/27/25 at 1713      Medication Order Taking? Sig Documenting Provider Last Dose Status   aspirin 81 mg chewable tablet [id number] Yes Chew 1 tablet (81 mg) once daily. Historical Provider, MD  Active   dilTIAZem CD (Cardizem CD) 180 mg 24 hr capsule [id number] Yes Take 1 capsule (180 mg) by mouth once daily. Daniella Hamlin MD  Active   gabapentin (Neurontin) 100 mg capsule [id number] Yes Take 1 capsule (100 mg) by mouth once daily at bedtime. Historical Provider, MD  Active   Lactobacillus acidophilus (PROBIOTIC ORAL) [id number] Yes Take by mouth. Historical Provider, MD  Active   metoprolol tartrate (Lopressor) 25 mg tablet [id number] Yes Take 1 tablet (25 mg) by mouth 2 times a day. Daniella Hamlin MD  Active   mirtazapine (Remeron) 15 mg tablet [id number] Yes Take 1 tablet (15 mg) by mouth once daily at bedtime. Daniella Hamlin MD  Active   montelukast (Singulair) 10 mg tablet [id number] Yes Take 1 tablet (10 mg) by mouth once daily at bedtime. Jessica Tobar DO  Active   pantoprazole  (ProtoNix) 40 mg EC tablet [id number] Yes Take 1 tablet (40 mg) by mouth 2 times a day. Daniella Hamlin MD  Active   tamsulosin (Flomax) 0.4 mg 24 hr capsule [id number] Yes Take 1 capsule (0.4 mg) by mouth once daily. Daniella Hamlin MD  Active   warfarin (Coumadin) 2 mg tablet [id number] Yes Take as directed per After Visit Summary. Daniella Hamlin MD  Active                    Past Medical History:   Diagnosis Date    Laceration without foreign body of pharynx and cervical esophagus, initial encounter     Tear of esophagus    Personal history of diseases of the skin and subcutaneous tissue 10/15/2019    History of pressure injury of skin    Personal history of other diseases of the circulatory system     H/O supraventricular tachycardia    Personal history of other diseases of the digestive system     History of hematemesis    Personal history of other diseases of the digestive system     History of Cabello's esophagus    Personal history of pneumonia (recurrent)     History of pneumonia       Past Surgical History:   Procedure Laterality Date    OTHER SURGICAL HISTORY  06/05/2019    Paraesophageal hiatal hernia repair       ROS  See HPI    Physical Exam  Vitals and nursing note reviewed.   Constitutional:       General: He is not in acute distress.     Appearance: Normal appearance. He is not ill-appearing, toxic-appearing or diaphoretic.   HENT:      Head: Normocephalic and atraumatic.      Right Ear: Tympanic membrane, ear canal and external ear normal.      Left Ear: Tympanic membrane, ear canal and external ear normal.      Nose: Congestion present.      Mouth/Throat:      Mouth: Mucous membranes are moist.      Pharynx: Oropharynx is clear.   Eyes:      Extraocular Movements: Extraocular movements intact.      Conjunctiva/sclera: Conjunctivae normal.      Pupils: Pupils are equal, round, and reactive to light.   Cardiovascular:      Rate and Rhythm: Normal rate.   Pulmonary:      Effort: Pulmonary effort is  normal.      Breath sounds: No decreased air movement. Examination of the left-lower field reveals wheezing. Wheezing present. No rhonchi or rales.   Lymphadenopathy:      Cervical: No cervical adenopathy.   Skin:     General: Skin is warm.   Neurological:      General: No focal deficit present.      Mental Status: He is alert and oriented to person, place, and time.   Psychiatric:         Mood and Affect: Mood normal.         Behavior: Behavior normal.           Assessment/Plan/MDM  Evangelina was seen today for uri.  Diagnoses and all orders for this visit:  Acute bronchitis, unspecified organism (Primary)  -     azithromycin (Zithromax Z-Abram) 250 mg tablet; Take 2 tablets (500 mg) on  Day 1,  followed by 1 tablet (250 mg) once daily on Days 2 through 5.  -     predniSONE (Deltasone) 20 mg tablet; Take 1 tablet (20 mg) by mouth once daily for 5 days.    Patient declines viral testing today and requests Zithromax and prednisone as he has tolerated this in the past several times. I am agreeable to this. Encouraged him to follow-up with Coumadin clinic and monitor heart rate. Encouraged pt to use otc cold remedies PRN, push PO fluids and rest. Patient's clinical presentation is otherwise unremarkable at this time. Patient is discharged with instructions to follow-up with primary care or seek emergency medical attention for worsening symptoms or any new concerns.     I did personally review Evangelina's past medical history, surgical history, social history, as well as family history (when relevant).  In this case, I also oversaw the his drug management by reviewing his medication list, allergy list, as well as the medications that I prescribed during the UC course and/or recommended as an out-patient (including possible OTC medications such as acetaminophen, NSAIDs , etc).    After reviewing the items above, I did look at previous medical documentation, such as recent hospitalizations, office visits, and/or recent  consultations with PCP/specialist.                          SDOH:   Another factor that I considered in Evangelina's care was his Social Determinants of Health (SDOH). During this UC encounter, he did not have social determinants of health. Those SDOH influencing Evangelina's care are: none      Atilio Spence CNP  [facility]  [phone]

## 2025-03-28 ENCOUNTER — TELEPHONE (OUTPATIENT)
Dept: PHARMACY | Facility: HOSPITAL | Age: 85
End: 2025-03-28
Payer: COMMERCIAL

## 2025-03-28 NOTE — TELEPHONE ENCOUNTER
Patient called the Coumadin Clinic reporting that he was seen at an urgent care yesterday and was given 2 Rxs in prednisone and a Zpak. Patient had an appointment for 4/21 based on his previous warfarin visit on 3/24. Due to both medications having slight interactions with warfarin, changed appointment to 4/2 which will be 1 day after finishing his zpak and prednisone to assess if his INR has been impacted and requires a change.

## 2025-04-02 ENCOUNTER — ANTICOAGULATION - WARFARIN VISIT (OUTPATIENT)
Dept: PHARMACY | Facility: HOSPITAL | Age: 85
End: 2025-04-02
Payer: COMMERCIAL

## 2025-04-02 DIAGNOSIS — I48.11 LONGSTANDING PERSISTENT ATRIAL FIBRILLATION (MULTI): Primary | ICD-10-CM

## 2025-04-02 LAB
POC INR: 2.6
POC PROTHROMBIN TIME: NORMAL

## 2025-04-02 PROCEDURE — 85610 PROTHROMBIN TIME: CPT | Mod: QW

## 2025-04-02 PROCEDURE — 99211 OFF/OP EST MAY X REQ PHY/QHP: CPT

## 2025-04-02 NOTE — PROGRESS NOTES
Pt enrolled in [facility] for management of Longstanding persistent atrial fibrillation (Multi) [I48.11].     Pt current location in clinic.     Current anticoagulant: Warfarin    Time since last visit: 2 weeks    Last INR: 2.1 on warfarin 10 mg in the previous week. No changes were made at that time.    Last Creatinine:   Lab Results   Component Value Date    CREATININE 1.03 08/15/2024     Last hemoglobin/hematocrit:  Lab Results   Component Value Date    HGB 13.4 (L) 08/15/2024     Lab Results   Component Value Date    HCT 43.7 08/15/2024       Current INR: 2.6 is therapeutic for goal range of 2.0-3.0 and is reflective of 10 mg in the previous week prior to visit.    Dosing confirmed with patient  Patient denies any missed doses.  Patient denies any medication changes, diet changes, or OTC/herbal supplement changes since last visit.  Patient denies any adverse reactions or barriers.  Patient denies any CP/SOB, fatigue, bleeding or bruising since last visit.   Patient denies any change in alcohol or tobacco use since last visit.   Patient denies any upcoming medical or dental procedures.    Plan:  Patient was instructed to continue with current warfarin dose.  INR follow up will occur in 4 weeks.  Patient was instructed to maintain consistent vegetable intake, to monitor for any bruising or bleeding, and to call with any medication changes or concerns.    Pt handout given with above information    Renato Lester, PharmD  P:[phone]  F:[phone]

## 2025-04-10 DIAGNOSIS — N40.1 BENIGN PROSTATIC HYPERPLASIA WITH URINARY FREQUENCY: ICD-10-CM

## 2025-04-10 DIAGNOSIS — R35.0 BENIGN PROSTATIC HYPERPLASIA WITH URINARY FREQUENCY: ICD-10-CM

## 2025-04-14 RX ORDER — TAMSULOSIN HYDROCHLORIDE 0.4 MG/1
0.4 CAPSULE ORAL DAILY
Qty: 90 CAPSULE | Refills: 3 | Status: SHIPPED | OUTPATIENT
Start: 2025-04-14

## 2025-04-18 DIAGNOSIS — I48.11 LONGSTANDING PERSISTENT ATRIAL FIBRILLATION (MULTI): ICD-10-CM

## 2025-04-21 ENCOUNTER — APPOINTMENT (OUTPATIENT)
Dept: PHARMACY | Facility: HOSPITAL | Age: 85
End: 2025-04-21
Payer: COMMERCIAL

## 2025-04-21 RX ORDER — METOPROLOL TARTRATE 25 MG/1
25 TABLET, FILM COATED ORAL 2 TIMES DAILY
Qty: 180 TABLET | Refills: 3 | Status: SHIPPED | OUTPATIENT
Start: 2025-04-21

## 2025-04-21 RX ORDER — PANTOPRAZOLE SODIUM 40 MG/1
40 TABLET, DELAYED RELEASE ORAL 2 TIMES DAILY
Qty: 180 TABLET | Refills: 1 | Status: SHIPPED | OUTPATIENT
Start: 2025-04-21

## 2025-04-23 DIAGNOSIS — J44.9 OBSTRUCTIVE LUNG DISEASE (MULTI): ICD-10-CM

## 2025-04-25 ENCOUNTER — ANTICOAGULATION - WARFARIN VISIT (OUTPATIENT)
Dept: PHARMACY | Facility: HOSPITAL | Age: 85
End: 2025-04-25
Payer: COMMERCIAL

## 2025-04-25 DIAGNOSIS — I48.11 LONGSTANDING PERSISTENT ATRIAL FIBRILLATION (MULTI): Primary | ICD-10-CM

## 2025-04-25 LAB
POC INR: 1.7 (ref 0.9–1.1)
POC PROTHROMBIN TIME: ABNORMAL (ref 9.3–12.5)

## 2025-04-25 PROCEDURE — 85610 PROTHROMBIN TIME: CPT | Mod: QW

## 2025-04-25 PROCEDURE — 99211 OFF/OP EST MAY X REQ PHY/QHP: CPT | Performed by: PHARMACIST

## 2025-04-25 NOTE — PROGRESS NOTES
Pt enrolled in [facility] for management of Longstanding persistent atrial fibrillation (Multi) [I48.11].     Pt current location in clinic.     Current anticoagulant: Warfarin    Time since last visit: 4 weeks    Last INR: 2.6 on warfarin 10 mg in the previous week. No changes were made at that time.    Last Creatinine:   Lab Results   Component Value Date    CREATININE 1.03 08/15/2024     Last hemoglobin/hematocrit:  Lab Results   Component Value Date    HGB 13.4 (L) 08/15/2024     Lab Results   Component Value Date    HCT 43.7 08/15/2024       Current INR: 1.7 is SUBtherapeutic for goal range of 2.0-3.0 and is reflective of 10 mg in the previous week prior to visit.    Dosing confirmed with patient  Patient denies any missed doses.  Patient denies any medication changes, diet changes, or OTC/herbal supplement changes since last visit.  Patient denies any adverse reactions or barriers.  Patient denies any CP/SOB, fatigue, bleeding or bruising since last visit.   Patient denies any change in alcohol or tobacco use since last visit.   Patient denies any upcoming medical or dental procedures.    Plan:  Patient was instructed to take 2mg x1, then resume usual dosing .  INR follow up will occur in 2 weeks.  Patient was instructed to maintain consistent vegetable intake, to monitor for any bruising or bleeding, and to call with any medication changes or concerns.    Pt handout given with above information    uAgie Biswas, PharmD  P:[phone]  F:[phone]

## 2025-04-27 DIAGNOSIS — F33.9 DEPRESSION, RECURRENT (CMS-HCC): ICD-10-CM

## 2025-04-27 DIAGNOSIS — R63.4 UNINTENTIONAL WEIGHT LOSS: ICD-10-CM

## 2025-04-28 RX ORDER — MIRTAZAPINE 15 MG/1
15 TABLET, FILM COATED ORAL NIGHTLY
Qty: 90 TABLET | Refills: 3 | Status: SHIPPED | OUTPATIENT
Start: 2025-04-28

## 2025-04-29 ENCOUNTER — APPOINTMENT (OUTPATIENT)
Dept: PHARMACY | Facility: HOSPITAL | Age: 85
End: 2025-04-29
Payer: COMMERCIAL

## 2025-04-30 ENCOUNTER — APPOINTMENT (OUTPATIENT)
Dept: PHARMACY | Facility: HOSPITAL | Age: 85
End: 2025-04-30
Payer: COMMERCIAL

## 2025-04-30 ENCOUNTER — APPOINTMENT (OUTPATIENT)
Dept: UROLOGY | Facility: CLINIC | Age: 85
End: 2025-04-30
Payer: COMMERCIAL

## 2025-05-02 ENCOUNTER — APPOINTMENT (OUTPATIENT)
Dept: UROLOGY | Facility: CLINIC | Age: 85
End: 2025-05-02
Payer: COMMERCIAL

## 2025-05-02 VITALS
DIASTOLIC BLOOD PRESSURE: 70 MMHG | WEIGHT: 121 LBS | BODY MASS INDEX: 18.99 KG/M2 | HEART RATE: 66 BPM | HEIGHT: 67 IN | SYSTOLIC BLOOD PRESSURE: 122 MMHG | RESPIRATION RATE: 16 BRPM

## 2025-05-02 DIAGNOSIS — N40.1 BENIGN PROSTATIC HYPERPLASIA WITH URINARY FREQUENCY: Primary | ICD-10-CM

## 2025-05-02 DIAGNOSIS — R35.0 BENIGN PROSTATIC HYPERPLASIA WITH URINARY FREQUENCY: Primary | ICD-10-CM

## 2025-05-02 DIAGNOSIS — N20.0 KIDNEY STONE: ICD-10-CM

## 2025-05-02 PROCEDURE — 3078F DIAST BP <80 MM HG: CPT | Performed by: UROLOGY

## 2025-05-02 PROCEDURE — 1157F ADVNC CARE PLAN IN RCRD: CPT | Performed by: UROLOGY

## 2025-05-02 PROCEDURE — 99214 OFFICE O/P EST MOD 30 MIN: CPT | Performed by: UROLOGY

## 2025-05-02 PROCEDURE — G2211 COMPLEX E/M VISIT ADD ON: HCPCS | Performed by: UROLOGY

## 2025-05-02 PROCEDURE — 3074F SYST BP LT 130 MM HG: CPT | Performed by: UROLOGY

## 2025-05-02 PROCEDURE — 1159F MED LIST DOCD IN RCRD: CPT | Performed by: UROLOGY

## 2025-05-02 RX ORDER — MONTELUKAST SODIUM 10 MG/1
10 TABLET ORAL NIGHTLY
Qty: 90 TABLET | Refills: 0 | Status: SHIPPED | OUTPATIENT
Start: 2025-05-02

## 2025-05-05 NOTE — PROGRESS NOTES
"CHIEF COMPLAINT:  Patient presents to the office today to discuss:  1. Nephrolithiasis follow-up  2. Angiomyolipoma follow-up    PAST UROLOGICAL HISTORY:  84-year-old man with history of bilateral nephrolithiasis and potential right-sided angiomyolipoma. Last ultrasound in April 2024 showed bilateral non-obstructive nephrolithiasis (6mm and 5mm) and some simple cysts. Currently on Flomax for lower urinary tract symptoms. Previously followed by Dr. Zhou who has since retired.    HPI TODAY 05/05/2025:  - First visit with me following Dr. Zhou's [facility]  - Denies knowledge of kidney stones, reports previous ultrasound \"couldn't find nothing\"  - Denies any pain or symptoms related to nephrolithiasis  - Denies hematuria  - Denies back pain  - Reports good urinary stream  - Self-describes as \"healthy as a horse\"    PHYSICAL EXAMINATION:  Constitutional: Elderly male in no apparent distress  Psychiatric: Alert and oriented, appropriate affect    ASSESSMENT AND PLAN:  84-year-old man with history of bilateral nephrolithiasis and potential right angiomyolipoma, currently asymptomatic.    1. Nephrolithiasis (N20.0)  - Assessment: Bilateral non-obstructive nephrolithiasis, asymptomatic  - Plan: Continue observation. 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 Lower urinary tract symptoms (R39.8)  - Assessment: Currently well-controlled on Flomax  - Plan: Continue current medication    ORDERS:  No orders.    FOLLOW UP:  Follow up in 1 year with renal ultrasound.    SHORT SUMMARY:  84-year-old man with asymptomatic bilateral nephrolithiasis and potential angiomyolipoma. Plan for continued observation with follow-up ultrasound in 1 year.  "

## 2025-05-08 ENCOUNTER — APPOINTMENT (OUTPATIENT)
Age: 85
End: 2025-05-08
Payer: MEDICARE

## 2025-05-08 VITALS
HEIGHT: 67 IN | HEART RATE: 72 BPM | SYSTOLIC BLOOD PRESSURE: 130 MMHG | BODY MASS INDEX: 18.93 KG/M2 | DIASTOLIC BLOOD PRESSURE: 70 MMHG | WEIGHT: 120.6 LBS | OXYGEN SATURATION: 94 %

## 2025-05-08 DIAGNOSIS — E44.1 MILD PROTEIN-CALORIE MALNUTRITION (MULTI): ICD-10-CM

## 2025-05-08 DIAGNOSIS — Z13.220 LIPID SCREENING: ICD-10-CM

## 2025-05-08 DIAGNOSIS — J30.89 ENVIRONMENTAL AND SEASONAL ALLERGIES: Primary | ICD-10-CM

## 2025-05-08 DIAGNOSIS — D63.8 ANEMIA, CHRONIC DISEASE: ICD-10-CM

## 2025-05-08 DIAGNOSIS — F32.A MILD DEPRESSION: ICD-10-CM

## 2025-05-08 PROCEDURE — 3075F SYST BP GE 130 - 139MM HG: CPT | Performed by: STUDENT IN AN ORGANIZED HEALTH CARE EDUCATION/TRAINING PROGRAM

## 2025-05-08 PROCEDURE — 1036F TOBACCO NON-USER: CPT | Performed by: STUDENT IN AN ORGANIZED HEALTH CARE EDUCATION/TRAINING PROGRAM

## 2025-05-08 PROCEDURE — G2211 COMPLEX E/M VISIT ADD ON: HCPCS | Performed by: STUDENT IN AN ORGANIZED HEALTH CARE EDUCATION/TRAINING PROGRAM

## 2025-05-08 PROCEDURE — 1159F MED LIST DOCD IN RCRD: CPT | Performed by: STUDENT IN AN ORGANIZED HEALTH CARE EDUCATION/TRAINING PROGRAM

## 2025-05-08 PROCEDURE — 1160F RVW MEDS BY RX/DR IN RCRD: CPT | Performed by: STUDENT IN AN ORGANIZED HEALTH CARE EDUCATION/TRAINING PROGRAM

## 2025-05-08 PROCEDURE — 3078F DIAST BP <80 MM HG: CPT | Performed by: STUDENT IN AN ORGANIZED HEALTH CARE EDUCATION/TRAINING PROGRAM

## 2025-05-08 PROCEDURE — 99214 OFFICE O/P EST MOD 30 MIN: CPT | Performed by: STUDENT IN AN ORGANIZED HEALTH CARE EDUCATION/TRAINING PROGRAM

## 2025-05-08 RX ORDER — CETIRIZINE HYDROCHLORIDE 10 MG/1
10 TABLET ORAL DAILY
Qty: 30 TABLET | Refills: 2 | Status: SHIPPED | OUTPATIENT
Start: 2025-05-08 | End: 2026-05-08

## 2025-05-08 RX ORDER — FLUTICASONE PROPIONATE 50 MCG
1 SPRAY, SUSPENSION (ML) NASAL DAILY
Qty: 16 G | Refills: 11 | Status: SHIPPED | OUTPATIENT
Start: 2025-05-08 | End: 2026-05-08

## 2025-05-08 ASSESSMENT — PATIENT HEALTH QUESTIONNAIRE - PHQ9
1. LITTLE INTEREST OR PLEASURE IN DOING THINGS: NOT AT ALL
SUM OF ALL RESPONSES TO PHQ9 QUESTIONS 1 AND 2: 0
2. FEELING DOWN, DEPRESSED OR HOPELESS: NOT AT ALL

## 2025-05-08 NOTE — PROGRESS NOTES
Subjective:  Evangelina Braxton is a 84 y.o. male who presents to clinic today for Follow-up (4 MO FU )      He's been feeling pretty good, his mood has continued to be slightly down.  He has weird dreams on mirtazapine. He used to wake up from the dreams. He no longer gets woken up from them but it does bother him. He hasn't noticed appetite or sleep changes.   He continues to struggle with eating, no longer having difficulty with swallowing  but just isnt hungry. Food doesn't taste good. Doesn't enjoy cooking for himself. He doesn't like eating a lone. He eats at [facility]  on aging.  He does take bus trips and play cards and watch his grandkids.     He is having watery eyes and nagging cough. He's not currently doing anything for this.     He had a good visit with his cardiologist.     Review of Systems    Assessment/Plan:  Evangelina Braxton is a 84 y.o. male  who presents to clinic today to address the following issues:   1. Environmental and seasonal allergies  fluticasone (Flonase) 50 mcg/actuation nasal spray    cetirizine (ZyrTEC) 10 mg tablet      2. Anemia, chronic disease  CBC    Ferritin    Iron and TIBC    CBC    Ferritin    Iron and TIBC      3. BMI less than 19,adult  CBC    Comprehensive Metabolic Panel    Thyroid Stimulating Hormone    CBC    Comprehensive Metabolic Panel    Thyroid Stimulating Hormone      4. Mild protein-calorie malnutrition (Multi)  Thyroid Stimulating Hormone    Thyroid Stimulating Hormone      5. Lipid screening  Lipid Panel    Lipid Panel      6. Mild depression          Allergies:  Chronic problem, new to provider, requires further workup and management  Start Flonase and Zyrtec as well as current Singulair    Will obtain repeat labs prior to follow-up which include CBC ferritin and iron  Mild protein calorie malnutrition/low BMI:  Will check TSH CBC and CMP in    Will stop patient's mirtazapine because did not feel any benefit with his sleep or his appetite  "but I did have a discussion with him that I am concerned his depression may worsen.  If this occurs he is going to go back onto mirtazapine.  Problem List Items Addressed This Visit       Anemia, chronic disease    Relevant Orders    CBC    Ferritin    Iron and TIBC    Mild depression     Other Visit Diagnoses         Environmental and seasonal allergies    -  Primary    Relevant Medications    fluticasone (Flonase) 50 mcg/actuation nasal spray    cetirizine (ZyrTEC) 10 mg tablet      BMI less than 19,adult        Relevant Orders    CBC    Comprehensive Metabolic Panel    Thyroid Stimulating Hormone      Mild protein-calorie malnutrition (Multi)        Relevant Orders    Thyroid Stimulating Hormone      Lipid screening        Relevant Orders    Lipid Panel            Patient Instructions   Take 1/2 tablet of Mirtazapine for 1 week and then stop.     Take zyrtec or ceterizine 10mg daily for allergies  Take 2 puffs of flonase per nostril once a day for allergies    Follow up: 3 months for Medicare wellness visit    Return precautions discussed.  An After Visit Summary was given to the patient.  All questions were answered and patient in agreement with plan.    Objective:  /70   Pulse 72   Ht 1.702 m (5' 7\")   Wt 54.7 kg (120 lb 9.6 oz)   SpO2 94%   BMI 18.89 kg/m²     Physical Exam  Vitals and nursing note reviewed.   Constitutional:       General: He is not in acute distress.     Appearance: He is not ill-appearing.   HENT:      Head: Normocephalic and atraumatic.      Mouth/Throat:      Mouth: Mucous membranes are moist.   Eyes:      General: No scleral icterus.        Right eye: No discharge.         Left eye: No discharge.      Extraocular Movements: Extraocular movements intact.      Conjunctiva/sclera: Conjunctivae normal.   Cardiovascular:      Rate and Rhythm: Normal rate. Rhythm irregular.   Pulmonary:      Effort: Pulmonary effort is normal. No respiratory distress.      Breath sounds: Normal " breath sounds.   Abdominal:      General: Abdomen is flat.      Palpations: Abdomen is soft.   Musculoskeletal:      Right lower leg: No edema.      Left lower leg: No edema.   Skin:     General: Skin is dry.   Neurological:      General: No focal deficit present.      Mental Status: He is alert and oriented to person, place, and time.   Psychiatric:         Thought Content: Thought content normal.         Judgment: Judgment normal.         I spent 18 minutes in total time for this visit including all related clinical activities before, during, and after the visit excluding other billable activities/procedure time.     Daniella Hamlin MD

## 2025-05-08 NOTE — PATIENT INSTRUCTIONS
Take 1/2 tablet of Mirtazapine for 1 week and then stop.     Take zyrtec or ceterizine 10mg daily for allergies  Take 2 puffs of flonase per nostril once a day for allergies

## 2025-05-09 ENCOUNTER — APPOINTMENT (OUTPATIENT)
Dept: PHARMACY | Facility: HOSPITAL | Age: 85
End: 2025-05-09
Payer: COMMERCIAL

## 2025-05-13 ENCOUNTER — ANTICOAGULATION - WARFARIN VISIT (OUTPATIENT)
Dept: PHARMACY | Facility: HOSPITAL | Age: 85
End: 2025-05-13
Payer: COMMERCIAL

## 2025-05-13 DIAGNOSIS — I48.11 LONGSTANDING PERSISTENT ATRIAL FIBRILLATION (MULTI): Primary | ICD-10-CM

## 2025-05-13 LAB
POC INR: 1.8 (ref 0.9–1.1)
POC PROTHROMBIN TIME: ABNORMAL (ref 9.3–12.5)

## 2025-05-13 PROCEDURE — 85610 PROTHROMBIN TIME: CPT | Mod: QW

## 2025-05-13 PROCEDURE — 99211 OFF/OP EST MAY X REQ PHY/QHP: CPT

## 2025-05-13 NOTE — PROGRESS NOTES
Pt enrolled in [facility] for management of Longstanding persistent atrial fibrillation (Multi) [I48.11].     Pt current location in clinic.     Current anticoagulant: Warfarin    Time since last visit: 3 weeks    Last INR: 1.7 on warfarin 10 mg in the previous week. Pt was instructed to take 2mg x1, then resume usual dosing at last visit.    Last Creatinine:   Lab Results   Component Value Date    CREATININE 1.03 08/15/2024     Last hemoglobin/hematocrit:  Lab Results   Component Value Date    HGB 13.4 (L) 08/15/2024     Lab Results   Component Value Date    HCT 43.7 08/15/2024       Current INR: 1.8 is SUBtherapeutic for goal range of 2.0-3.0 and is reflective of 10 mg in the previous week prior to visit.    Dosing confirmed with patient  Patient denies any missed doses.  Patient denies any medication changes, diet changes, or OTC/herbal supplement changes since last visit.  Patient denies any adverse reactions or barriers.  Patient denies any CP/SOB, fatigue, bleeding or bruising since last visit.   Patient denies any change in alcohol or tobacco use since last visit.   Patient denies any upcoming medical or dental procedures.    No apparent reason patient is subtherapeutic. Second visit in a row that his INR is subtherapeutic. Will boost for now, but if he is low at next visit, then will likely have to change regimen.    Plan:  Patient was instructed to take 2 mg today and tomorrow then resume current regimen of 2 mg every Sun, Tue, Thu; 1 mg all other days .  INR follow up will occur in 3 weeks.  Patient was instructed to maintain consistent vegetable intake, to monitor for any bruising or bleeding, and to call with any medication changes or concerns.    Pt handout given with above information    Jolynn Crabtree, AdiliaD  P:[phone]  F:[phone]

## 2025-05-14 ENCOUNTER — TELEPHONE (OUTPATIENT)
Facility: CLINIC | Age: 85
End: 2025-05-14
Payer: COMMERCIAL

## 2025-05-14 DIAGNOSIS — R79.0 LOW FERRITIN LEVEL: Primary | ICD-10-CM

## 2025-05-14 LAB
ALBUMIN SERPL-MCNC: 4.3 G/DL (ref 3.6–5.1)
ALP SERPL-CCNC: 53 U/L (ref 35–144)
ALT SERPL-CCNC: 7 U/L (ref 9–46)
ANION GAP SERPL CALCULATED.4IONS-SCNC: 11 MMOL/L (CALC) (ref 7–17)
AST SERPL-CCNC: 14 U/L (ref 10–35)
BILIRUB SERPL-MCNC: 1 MG/DL (ref 0.2–1.2)
BUN SERPL-MCNC: 15 MG/DL (ref 7–25)
CALCIUM SERPL-MCNC: 9 MG/DL (ref 8.6–10.3)
CHLORIDE SERPL-SCNC: 105 MMOL/L (ref 98–110)
CHOLEST SERPL-MCNC: 213 MG/DL
CHOLEST/HDLC SERPL: 3.4 (CALC)
CO2 SERPL-SCNC: 26 MMOL/L (ref 20–32)
CREAT SERPL-MCNC: 0.92 MG/DL (ref 0.7–1.22)
EGFRCR SERPLBLD CKD-EPI 2021: 81 ML/MIN/1.73M2
ERYTHROCYTE [DISTWIDTH] IN BLOOD BY AUTOMATED COUNT: 11.7 % (ref 11–15)
FERRITIN SERPL-MCNC: 15 NG/ML (ref 24–380)
GLUCOSE SERPL-MCNC: 89 MG/DL (ref 65–99)
HCT VFR BLD AUTO: 42.8 % (ref 38.5–50)
HDLC SERPL-MCNC: 62 MG/DL
HGB BLD-MCNC: 13.6 G/DL (ref 13.2–17.1)
IRON SATN MFR SERPL: 23 % (CALC) (ref 20–48)
IRON SERPL-MCNC: 84 MCG/DL (ref 50–180)
LDLC SERPL CALC-MCNC: 129 MG/DL (CALC)
MCH RBC QN AUTO: 30 PG (ref 27–33)
MCHC RBC AUTO-ENTMCNC: 31.8 G/DL (ref 32–36)
MCV RBC AUTO: 94.3 FL (ref 80–100)
NONHDLC SERPL-MCNC: 151 MG/DL (CALC)
PLATELET # BLD AUTO: 299 THOUSAND/UL (ref 140–400)
PMV BLD REES-ECKER: 10 FL (ref 7.5–12.5)
POTASSIUM SERPL-SCNC: 4.5 MMOL/L (ref 3.5–5.3)
PROT SERPL-MCNC: 6.4 G/DL (ref 6.1–8.1)
RBC # BLD AUTO: 4.54 MILLION/UL (ref 4.2–5.8)
SODIUM SERPL-SCNC: 142 MMOL/L (ref 135–146)
TIBC SERPL-MCNC: 373 MCG/DL (CALC) (ref 250–425)
TRIGL SERPL-MCNC: 110 MG/DL
TSH SERPL-ACNC: 1.89 MIU/L (ref 0.4–4.5)
WBC # BLD AUTO: 8.2 THOUSAND/UL (ref 3.8–10.8)

## 2025-05-14 RX ORDER — FERROUS SULFATE 325(65) MG
325 TABLET, DELAYED RELEASE (ENTERIC COATED) ORAL EVERY OTHER DAY
Qty: 45 TABLET | Refills: 3 | Status: SHIPPED | OUTPATIENT
Start: 2025-05-14 | End: 2026-05-14

## 2025-05-14 NOTE — TELEPHONE ENCOUNTER
Team:    Please call patient to discuss the following overall his labs look okay but he has slightly low iron stores.  I would recommend that he takes iron every other day which I will send to his pharmacy.  This can cause some people to have upset stomach or constipation so if he experiences that he is okay to stop it..    Once finished please close the encounter.    Thank you,  Daniella Hamlin MD

## 2025-05-27 NOTE — TELEPHONE ENCOUNTER
Patient notified, stated ins wouldn't cover it through the pharmacy so he got the OTC at [facility] and that it's the same as the Rx you sent in. Patient stated he understood side effects and instructions on how to take it and that he would start taking it.

## 2025-06-03 ENCOUNTER — ANTICOAGULATION - WARFARIN VISIT (OUTPATIENT)
Dept: PHARMACY | Facility: HOSPITAL | Age: 85
End: 2025-06-03
Payer: COMMERCIAL

## 2025-06-03 DIAGNOSIS — I48.11 LONGSTANDING PERSISTENT ATRIAL FIBRILLATION (MULTI): Primary | ICD-10-CM

## 2025-06-03 LAB
POC INR: 2.7 (ref 0.9–1.1)
POC PROTHROMBIN TIME: ABNORMAL (ref 9.3–12.5)

## 2025-06-03 PROCEDURE — 85610 PROTHROMBIN TIME: CPT | Mod: QW

## 2025-06-03 PROCEDURE — 99211 OFF/OP EST MAY X REQ PHY/QHP: CPT | Performed by: PHARMACIST

## 2025-06-03 NOTE — PROGRESS NOTES
Pt enrolled in [facility] for management of Longstanding persistent atrial fibrillation (Multi) [I48.11].     Pt current location in clinic.     Current anticoagulant: Warfarin    Time since last visit: 3 weeks    Last INR: 1.8 on warfarin 10 mg in the previous week. Pt was instructed to take 2 mg x 1 at last visit.    Last Creatinine:   Lab Results   Component Value Date    CREATININE 0.92 05/13/2025     Last hemoglobin/hematocrit:  Lab Results   Component Value Date    HGB 13.6 05/13/2025     Lab Results   Component Value Date    HCT 42.8 05/13/2025       Current INR: 2.7 is therapeutic for goal range of 2.0-3.0 and is reflective of 10 mg in the previous week prior to visit.    Dosing confirmed with patient  Patient denies any missed doses.  Patient denies any medication changes, diet changes, or OTC/herbal supplement changes since last visit.  Patient denies any adverse reactions or barriers.  Patient denies any CP/SOB, fatigue, bleeding or bruising since last visit.   Patient denies any change in alcohol or tobacco use since last visit.   Patient denies any upcoming medical or dental procedures.    Plan:  Patient was instructed to continue with current warfarin dose.  INR follow up will occur in 4 weeks.  Patient was instructed to maintain consistent vegetable intake, to monitor for any bruising or bleeding, and to call with any medication changes or concerns.    Pt handout given with above information    Kerry Spence, PharmD  P:[phone]  F:[phone]

## 2025-06-04 ENCOUNTER — OFFICE VISIT (OUTPATIENT)
Dept: URGENT CARE | Facility: CLINIC | Age: 85
End: 2025-06-04
Payer: COMMERCIAL

## 2025-06-04 VITALS
TEMPERATURE: 98.9 F | OXYGEN SATURATION: 93 % | DIASTOLIC BLOOD PRESSURE: 62 MMHG | RESPIRATION RATE: 16 BRPM | SYSTOLIC BLOOD PRESSURE: 109 MMHG | HEART RATE: 91 BPM

## 2025-06-04 DIAGNOSIS — J04.0 LARYNGITIS: Primary | ICD-10-CM

## 2025-06-04 DIAGNOSIS — J30.9 ALLERGIC RHINITIS, UNSPECIFIED SEASONALITY, UNSPECIFIED TRIGGER: ICD-10-CM

## 2025-06-04 PROCEDURE — 99214 OFFICE O/P EST MOD 30 MIN: CPT | Performed by: PHYSICIAN ASSISTANT

## 2025-06-04 NOTE — PROGRESS NOTES
Subjective   Patient ID: Evangelina Braxton is a 84 y.o. male who presents for Sore Throat (Sore throat x 10 days ).  HPI  Presents for evaluation of URI.  Symptoms including sore throat, congestion, nasal drip, and loss of voice have been present for 10 days and refractory to OTC meds.  No fever, chills, nausea, vomiting, abdominal pain, CP, or SOB.  No exacerbating factors    Review of Systems    Constitutional:  See HPI    ENT: See HPI    Neurologic:  Alert and oriented X4, No numbness, No tingling.    All other systems are negative     Objective     /62   Pulse 91   Temp 37.2 °C (98.9 °F) (Temporal)   Resp 16   SpO2 93%     Physical Exam    General:  Alert and oriented, No acute distress.    Eye:  Pupils are equal, round and reactive to light, Normal conjunctiva.    HENT:  Normocephalic, complete loss of voice noted; unremarkable oropharynx; no cervical adenopathy or sinus tenderness  Neck:  Supple    Respiratory: Respirations are non-labored: LCTA bilaterally  Musculoskeletal: Normal ROM and strength  Integumentary:  Warm, Dry, Intact, No pallor, No rash.    Neurologic:  Alert, Oriented, Normal sensory, Cranial Nerves II-XII are grossly intact  Psychiatric:  Cooperative, Appropriate mood & affect.    Assessment/Plan   Exam is consistent with laryngitis likely secondary to seasonal allergy flare.  Prescriptions for Omnicef and Medrol.  Patient does take Coumadin.  Patient advised to take the steroids with food or milk.  Patient's clinical presentation is otherwise unremarkable at this time. Patient is discharged with instructions to follow-up with primary care or seek emergency medical attention for worsening symptoms or any new concerns.  Problem List Items Addressed This Visit    None  Visit Diagnoses         Laryngitis    -  Primary      Allergic rhinitis, unspecified seasonality, unspecified trigger                Final diagnoses:   [J04.0] Laryngitis   [J30.9] Allergic rhinitis, unspecified  seasonality, unspecified trigger

## 2025-07-01 ENCOUNTER — ANTICOAGULATION - WARFARIN VISIT (OUTPATIENT)
Dept: PHARMACY | Facility: HOSPITAL | Age: 85
End: 2025-07-01
Payer: COMMERCIAL

## 2025-07-01 DIAGNOSIS — I48.11 LONGSTANDING PERSISTENT ATRIAL FIBRILLATION (MULTI): Primary | ICD-10-CM

## 2025-07-01 LAB
POC INR: 2.5 (ref 0.9–1.1)
POC PROTHROMBIN TIME: ABNORMAL (ref 9.3–12.5)

## 2025-07-01 PROCEDURE — 99211 OFF/OP EST MAY X REQ PHY/QHP: CPT | Performed by: PHARMACIST

## 2025-07-01 PROCEDURE — 85610 PROTHROMBIN TIME: CPT | Mod: QW | Performed by: STUDENT IN AN ORGANIZED HEALTH CARE EDUCATION/TRAINING PROGRAM

## 2025-07-01 NOTE — PROGRESS NOTES
Pt enrolled in [facility] for management of Longstanding persistent atrial fibrillation (Multi) [I48.11].     Pt current location in clinic.     Current anticoagulant: Warfarin    Time since last visit: 4 weeks    Last INR: 2.7 on warfarin 10 mg in the previous week. No changes were made at that time.    Last Creatinine:   Lab Results   Component Value Date    CREATININE 0.92 05/13/2025     Last hemoglobin/hematocrit:  Lab Results   Component Value Date    HGB 13.6 05/13/2025     Lab Results   Component Value Date    HCT 42.8 05/13/2025       Current INR: 2.5 is therapeutic for goal range of 2.0-3.0 and is reflective of 10 mg in the previous week prior to visit.    Dosing confirmed with patient  Patient denies any missed doses.  Patient denies any diet changes, or OTC/herbal supplement changes since last visit - was on cefdinir for 7 days, finished a couple of weeks ago  Patient denies any adverse reactions or barriers.  Patient denies any CP/SOB, fatigue, bleeding or bruising since last visit.   Patient denies any change in alcohol or tobacco use since last visit.   Patient denies any upcoming medical or dental procedures.    Plan:  Patient was instructed to continue with current warfarin dose.  INR follow up will occur in 5 weeks.  Patient was instructed to maintain consistent vegetable intake, to monitor for any bruising or bleeding, and to call with any medication changes or concerns.    Pt handout given with above information    Augie Biswas, PharmD  P:[phone]  F:[phone]

## 2025-07-14 ENCOUNTER — TELEPHONE (OUTPATIENT)
Age: 85
End: 2025-07-14
Payer: COMMERCIAL

## 2025-07-14 DIAGNOSIS — J44.9 OBSTRUCTIVE LUNG DISEASE (MULTI): Primary | ICD-10-CM

## 2025-07-14 DIAGNOSIS — I48.11 LONGSTANDING PERSISTENT ATRIAL FIBRILLATION (MULTI): ICD-10-CM

## 2025-07-14 RX ORDER — DILTIAZEM HYDROCHLORIDE 180 MG/1
180 CAPSULE, COATED, EXTENDED RELEASE ORAL DAILY
Qty: 90 CAPSULE | Refills: 2 | Status: SHIPPED | OUTPATIENT
Start: 2025-07-14

## 2025-07-14 RX ORDER — ALBUTEROL SULFATE 0.83 MG/ML
2.5 SOLUTION RESPIRATORY (INHALATION) EVERY 6 HOURS PRN
Qty: 75 ML | Refills: 2 | Status: SHIPPED | OUTPATIENT
Start: 2025-07-14 | End: 2026-07-14

## 2025-07-14 NOTE — TELEPHONE ENCOUNTER
PT IS ALSO ASKING FOR REFILL ON ALBUTEROL SULFATE INHALATION SOLUTION 2.5MG/3ML SOLUTION.  PT USES PRN, YOU HAVENT FILLED THIS FOR HIM YET PREV  DID IT LAST FOR HIM.

## 2025-07-14 NOTE — TELEPHONE ENCOUNTER
DILTIAZEM 180 MG WRITTEN GENERIC, BUT MARKED SÁNCHEZ.   DO YOU WANT SÁNCHEZ ?       MALOU MERRILL MART

## 2025-07-25 ENCOUNTER — TELEPHONE (OUTPATIENT)
Age: 85
End: 2025-07-25
Payer: COMMERCIAL

## 2025-07-25 DIAGNOSIS — J44.9 OBSTRUCTIVE LUNG DISEASE (MULTI): ICD-10-CM

## 2025-07-28 RX ORDER — MONTELUKAST SODIUM 10 MG/1
10 TABLET ORAL NIGHTLY
Qty: 90 TABLET | Refills: 3 | Status: SHIPPED | OUTPATIENT
Start: 2025-07-28

## 2025-08-05 ENCOUNTER — APPOINTMENT (OUTPATIENT)
Dept: PHARMACY | Facility: HOSPITAL | Age: 85
End: 2025-08-05
Payer: COMMERCIAL

## 2025-08-05 DIAGNOSIS — I48.11 LONGSTANDING PERSISTENT ATRIAL FIBRILLATION (MULTI): Primary | ICD-10-CM

## 2025-08-05 LAB
POC INR: 2.5 (ref 0.9–1.1)
POC PROTHROMBIN TIME: ABNORMAL (ref 9.3–12.5)

## 2025-08-05 PROCEDURE — 85610 PROTHROMBIN TIME: CPT | Mod: QW | Performed by: STUDENT IN AN ORGANIZED HEALTH CARE EDUCATION/TRAINING PROGRAM

## 2025-08-05 PROCEDURE — 99211 OFF/OP EST MAY X REQ PHY/QHP: CPT | Performed by: PHARMACIST

## 2025-08-05 NOTE — PROGRESS NOTES
Pt enrolled in [facility] for management of Longstanding persistent atrial fibrillation (Multi) [I48.11].     Pt current location in clinic.     Current anticoagulant: Warfarin    Time since last visit: 5 weeks    Last INR: 2.5 on warfarin 10 mg in the previous week. No changes were made at that time.    Last Creatinine:   Lab Results   Component Value Date    CREATININE 0.92 05/13/2025     Last hemoglobin/hematocrit:  Lab Results   Component Value Date    HGB 13.6 05/13/2025     Lab Results   Component Value Date    HCT 42.8 05/13/2025       Current INR: 2.5 is therapeutic for goal range of 2.0-3.0 and is reflective of 10 mg in the previous week prior to visit.    Dosing confirmed with patient  Patient denies any missed doses.  Patient denies any medication changes, diet changes, or OTC/herbal supplement changes since last visit.  Patient denies any adverse reactions or barriers.  Patient denies any CP/SOB, fatigue, bleeding or bruising since last visit.   Patient denies any change in alcohol or tobacco use since last visit.   Patient denies any upcoming medical or dental procedures.    Plan:  Patient was instructed to continue with current warfarin dose.  INR follow up will occur in 5 weeks.  Patient was instructed to maintain consistent vegetable intake, to monitor for any bruising or bleeding, and to call with any medication changes or concerns.    Pt handout given with above information    Augie Biswas, PharmD  P:[phone]  F:[phone]

## 2025-08-26 ENCOUNTER — APPOINTMENT (OUTPATIENT)
Dept: RADIOLOGY | Facility: HOSPITAL | Age: 85
End: 2025-08-26
Payer: COMMERCIAL

## 2025-08-26 ENCOUNTER — HOSPITAL ENCOUNTER (EMERGENCY)
Facility: HOSPITAL | Age: 85
Discharge: HOME | End: 2025-08-26
Attending: EMERGENCY MEDICINE
Payer: COMMERCIAL

## 2025-08-26 VITALS
OXYGEN SATURATION: 93 % | SYSTOLIC BLOOD PRESSURE: 125 MMHG | DIASTOLIC BLOOD PRESSURE: 44 MMHG | RESPIRATION RATE: 17 BRPM | WEIGHT: 120.59 LBS | TEMPERATURE: 97.3 F | HEART RATE: 84 BPM | BODY MASS INDEX: 18.89 KG/M2

## 2025-08-26 DIAGNOSIS — D72.829 LEUKOCYTOSIS, UNSPECIFIED TYPE: ICD-10-CM

## 2025-08-26 DIAGNOSIS — R10.9 ABDOMINAL PAIN, UNSPECIFIED ABDOMINAL LOCATION: ICD-10-CM

## 2025-08-26 DIAGNOSIS — R11.2 NAUSEA AND VOMITING, UNSPECIFIED VOMITING TYPE: Primary | ICD-10-CM

## 2025-08-26 LAB
ALBUMIN SERPL BCP-MCNC: 4.1 G/DL (ref 3.4–5)
ALP SERPL-CCNC: 46 U/L (ref 33–136)
ALT SERPL W P-5'-P-CCNC: 7 U/L (ref 10–52)
ANION GAP SERPL CALC-SCNC: 12 MMOL/L (ref 10–20)
APPEARANCE UR: CLEAR
AST SERPL W P-5'-P-CCNC: 12 U/L (ref 9–39)
BASOPHILS # BLD MANUAL: 0 X10*3/UL (ref 0–0.1)
BASOPHILS NFR BLD MANUAL: 0 %
BILIRUB SERPL-MCNC: 1.4 MG/DL (ref 0–1.2)
BILIRUB UR STRIP.AUTO-MCNC: NEGATIVE MG/DL
BUN SERPL-MCNC: 15 MG/DL (ref 6–23)
CALCIUM SERPL-MCNC: 8.8 MG/DL (ref 8.6–10.3)
CHLORIDE SERPL-SCNC: 106 MMOL/L (ref 98–107)
CO2 SERPL-SCNC: 24 MMOL/L (ref 21–32)
COLOR UR: ABNORMAL
CREAT SERPL-MCNC: 0.91 MG/DL (ref 0.5–1.3)
EGFRCR SERPLBLD CKD-EPI 2021: 82 ML/MIN/1.73M*2
EOSINOPHIL # BLD MANUAL: 0 X10*3/UL (ref 0–0.4)
EOSINOPHIL NFR BLD MANUAL: 0 %
ERYTHROCYTE [DISTWIDTH] IN BLOOD BY AUTOMATED COUNT: 12.5 % (ref 11.5–14.5)
GLUCOSE SERPL-MCNC: 112 MG/DL (ref 74–99)
GLUCOSE UR STRIP.AUTO-MCNC: ABNORMAL MG/DL
HCT VFR BLD AUTO: 40.9 % (ref 41–52)
HGB BLD-MCNC: 13.2 G/DL (ref 13.5–17.5)
IMM GRANULOCYTES # BLD AUTO: 0.04 X10*3/UL (ref 0–0.5)
IMM GRANULOCYTES NFR BLD AUTO: 0.2 % (ref 0–0.9)
KETONES UR STRIP.AUTO-MCNC: NEGATIVE MG/DL
LACTATE SERPL-SCNC: 1.3 MMOL/L (ref 0.4–2)
LEUKOCYTE ESTERASE UR QL STRIP.AUTO: ABNORMAL
LIPASE SERPL-CCNC: 5 U/L (ref 9–82)
LYMPHOCYTES # BLD MANUAL: 0.62 X10*3/UL (ref 0.8–3)
LYMPHOCYTES NFR BLD MANUAL: 3 %
MCH RBC QN AUTO: 29.4 PG (ref 26–34)
MCHC RBC AUTO-ENTMCNC: 32.3 G/DL (ref 32–36)
MCV RBC AUTO: 91 FL (ref 80–100)
MONOCYTES # BLD MANUAL: 2.28 X10*3/UL (ref 0.05–0.8)
MONOCYTES NFR BLD MANUAL: 11 %
MUCOUS THREADS #/AREA URNS AUTO: ABNORMAL /LPF
NEUTROPHILS # BLD MANUAL: 17.81 X10*3/UL (ref 1.6–5.5)
NEUTS BAND # BLD MANUAL: 1.66 X10*3/UL (ref 0–0.5)
NEUTS BAND NFR BLD MANUAL: 8 %
NEUTS SEG # BLD MANUAL: 16.15 X10*3/UL (ref 1.6–5)
NEUTS SEG NFR BLD MANUAL: 78 %
NITRITE UR QL STRIP.AUTO: NEGATIVE
NRBC BLD-RTO: 0 /100 WBCS (ref 0–0)
PH UR STRIP.AUTO: 6 [PH]
PLATELET # BLD AUTO: 304 X10*3/UL (ref 150–450)
POTASSIUM SERPL-SCNC: 4.2 MMOL/L (ref 3.5–5.3)
PROT SERPL-MCNC: 6.6 G/DL (ref 6.4–8.2)
PROT UR STRIP.AUTO-MCNC: ABNORMAL MG/DL
RBC # BLD AUTO: 4.49 X10*6/UL (ref 4.5–5.9)
RBC # UR STRIP.AUTO: ABNORMAL MG/DL
RBC #/AREA URNS AUTO: >20 /HPF
RBC MORPH BLD: ABNORMAL
SODIUM SERPL-SCNC: 138 MMOL/L (ref 136–145)
SP GR UR STRIP.AUTO: 1.02
TOTAL CELLS COUNTED BLD: 100
UROBILINOGEN UR STRIP.AUTO-MCNC: NORMAL MG/DL
WBC # BLD AUTO: 20.7 X10*3/UL (ref 4.4–11.3)
WBC #/AREA URNS AUTO: ABNORMAL /HPF

## 2025-08-26 PROCEDURE — 74176 CT ABD & PELVIS W/O CONTRAST: CPT | Performed by: RADIOLOGY

## 2025-08-26 PROCEDURE — 83605 ASSAY OF LACTIC ACID: CPT | Performed by: EMERGENCY MEDICINE

## 2025-08-26 PROCEDURE — 85007 BL SMEAR W/DIFF WBC COUNT: CPT | Performed by: EMERGENCY MEDICINE

## 2025-08-26 PROCEDURE — 2500000004 HC RX 250 GENERAL PHARMACY W/ HCPCS (ALT 636 FOR OP/ED): Performed by: EMERGENCY MEDICINE

## 2025-08-26 PROCEDURE — 80053 COMPREHEN METABOLIC PANEL: CPT | Performed by: EMERGENCY MEDICINE

## 2025-08-26 PROCEDURE — 74176 CT ABD & PELVIS W/O CONTRAST: CPT

## 2025-08-26 PROCEDURE — 96361 HYDRATE IV INFUSION ADD-ON: CPT

## 2025-08-26 PROCEDURE — 36415 COLL VENOUS BLD VENIPUNCTURE: CPT | Performed by: EMERGENCY MEDICINE

## 2025-08-26 PROCEDURE — 99284 EMERGENCY DEPT VISIT MOD MDM: CPT | Mod: 25 | Performed by: EMERGENCY MEDICINE

## 2025-08-26 PROCEDURE — 96374 THER/PROPH/DIAG INJ IV PUSH: CPT

## 2025-08-26 PROCEDURE — 87086 URINE CULTURE/COLONY COUNT: CPT | Mod: SAMLAB | Performed by: EMERGENCY MEDICINE

## 2025-08-26 PROCEDURE — 83690 ASSAY OF LIPASE: CPT | Performed by: EMERGENCY MEDICINE

## 2025-08-26 PROCEDURE — 85027 COMPLETE CBC AUTOMATED: CPT | Performed by: EMERGENCY MEDICINE

## 2025-08-26 PROCEDURE — 81001 URINALYSIS AUTO W/SCOPE: CPT | Performed by: EMERGENCY MEDICINE

## 2025-08-26 RX ORDER — ONDANSETRON HYDROCHLORIDE 2 MG/ML
4 INJECTION, SOLUTION INTRAVENOUS ONCE
Status: COMPLETED | OUTPATIENT
Start: 2025-08-26 | End: 2025-08-26

## 2025-08-26 RX ORDER — SODIUM CHLORIDE 9 MG/ML
125 INJECTION, SOLUTION INTRAVENOUS CONTINUOUS
Status: DISCONTINUED | OUTPATIENT
Start: 2025-08-26 | End: 2025-08-26 | Stop reason: HOSPADM

## 2025-08-26 RX ADMIN — ONDANSETRON 4 MG: 2 INJECTION INTRAMUSCULAR; INTRAVENOUS at 13:23

## 2025-08-26 RX ADMIN — SODIUM CHLORIDE 125 ML/HR: 0.9 INJECTION, SOLUTION INTRAVENOUS at 13:28

## 2025-08-26 ASSESSMENT — PAIN - FUNCTIONAL ASSESSMENT
PAIN_FUNCTIONAL_ASSESSMENT: 0-10
PAIN_FUNCTIONAL_ASSESSMENT: 0-10

## 2025-08-26 ASSESSMENT — PAIN SCALES - GENERAL
PAINLEVEL_OUTOF10: 0 - NO PAIN
PAINLEVEL_OUTOF10: 0 - NO PAIN

## 2025-08-27 LAB — HOLD SPECIMEN: NORMAL

## 2025-08-28 ENCOUNTER — OFFICE VISIT (OUTPATIENT)
Age: 85
End: 2025-08-28
Payer: COMMERCIAL

## 2025-08-28 VITALS
HEIGHT: 67 IN | SYSTOLIC BLOOD PRESSURE: 110 MMHG | OXYGEN SATURATION: 95 % | BODY MASS INDEX: 18.36 KG/M2 | DIASTOLIC BLOOD PRESSURE: 48 MMHG | HEART RATE: 83 BPM | WEIGHT: 117 LBS

## 2025-08-28 DIAGNOSIS — R17 ELEVATED BILIRUBIN: Primary | ICD-10-CM

## 2025-08-28 DIAGNOSIS — D72.829 LEUKOCYTOSIS, UNSPECIFIED TYPE: ICD-10-CM

## 2025-08-28 DIAGNOSIS — R63.6 UNDERWEIGHT (BMI < 18.5): ICD-10-CM

## 2025-08-28 LAB — BACTERIA UR CULT: NORMAL

## 2025-08-28 PROCEDURE — 3078F DIAST BP <80 MM HG: CPT | Performed by: STUDENT IN AN ORGANIZED HEALTH CARE EDUCATION/TRAINING PROGRAM

## 2025-08-28 PROCEDURE — 1036F TOBACCO NON-USER: CPT | Performed by: STUDENT IN AN ORGANIZED HEALTH CARE EDUCATION/TRAINING PROGRAM

## 2025-08-28 PROCEDURE — 3074F SYST BP LT 130 MM HG: CPT | Performed by: STUDENT IN AN ORGANIZED HEALTH CARE EDUCATION/TRAINING PROGRAM

## 2025-08-28 PROCEDURE — 1124F ACP DISCUSS-NO DSCNMKR DOCD: CPT | Performed by: STUDENT IN AN ORGANIZED HEALTH CARE EDUCATION/TRAINING PROGRAM

## 2025-08-28 PROCEDURE — 1160F RVW MEDS BY RX/DR IN RCRD: CPT | Performed by: STUDENT IN AN ORGANIZED HEALTH CARE EDUCATION/TRAINING PROGRAM

## 2025-08-28 PROCEDURE — 99214 OFFICE O/P EST MOD 30 MIN: CPT | Performed by: STUDENT IN AN ORGANIZED HEALTH CARE EDUCATION/TRAINING PROGRAM

## 2025-08-28 PROCEDURE — 1159F MED LIST DOCD IN RCRD: CPT | Performed by: STUDENT IN AN ORGANIZED HEALTH CARE EDUCATION/TRAINING PROGRAM

## 2025-08-28 ASSESSMENT — PATIENT HEALTH QUESTIONNAIRE - PHQ9
2. FEELING DOWN, DEPRESSED OR HOPELESS: SEVERAL DAYS
SUM OF ALL RESPONSES TO PHQ9 QUESTIONS 1 AND 2: 1
1. LITTLE INTEREST OR PLEASURE IN DOING THINGS: NOT AT ALL

## 2025-08-29 LAB
ALBUMIN SERPL-MCNC: 4.1 G/DL (ref 3.6–5.1)
ALP SERPL-CCNC: 50 U/L (ref 35–144)
ALT SERPL-CCNC: 7 U/L (ref 9–46)
ANION GAP SERPL CALCULATED.4IONS-SCNC: 10 MMOL/L (CALC) (ref 7–17)
AST SERPL-CCNC: 12 U/L (ref 10–35)
BILIRUB SERPL-MCNC: 0.9 MG/DL (ref 0.2–1.2)
BUN SERPL-MCNC: 14 MG/DL (ref 7–25)
CALCIUM SERPL-MCNC: 9 MG/DL (ref 8.6–10.3)
CHLORIDE SERPL-SCNC: 102 MMOL/L (ref 98–110)
CO2 SERPL-SCNC: 26 MMOL/L (ref 20–32)
CREAT SERPL-MCNC: 0.89 MG/DL (ref 0.7–1.22)
EGFRCR SERPLBLD CKD-EPI 2021: 83 ML/MIN/1.73M2
ERYTHROCYTE [DISTWIDTH] IN BLOOD BY AUTOMATED COUNT: 11.9 % (ref 11–15)
GLUCOSE SERPL-MCNC: 96 MG/DL (ref 65–139)
HCT VFR BLD AUTO: 39.8 % (ref 38.5–50)
HGB BLD-MCNC: 12.9 G/DL (ref 13.2–17.1)
MCH RBC QN AUTO: 29.8 PG (ref 27–33)
MCHC RBC AUTO-ENTMCNC: 32.4 G/DL (ref 32–36)
MCV RBC AUTO: 91.9 FL (ref 80–100)
PLATELET # BLD AUTO: 362 THOUSAND/UL (ref 140–400)
PMV BLD REES-ECKER: 9.9 FL (ref 7.5–12.5)
POTASSIUM SERPL-SCNC: 4.2 MMOL/L (ref 3.5–5.3)
PROT SERPL-MCNC: 6.5 G/DL (ref 6.1–8.1)
RBC # BLD AUTO: 4.33 MILLION/UL (ref 4.2–5.8)
SODIUM SERPL-SCNC: 138 MMOL/L (ref 135–146)
WBC # BLD AUTO: 13.3 THOUSAND/UL (ref 3.8–10.8)

## 2025-09-09 ENCOUNTER — APPOINTMENT (OUTPATIENT)
Dept: PHARMACY | Facility: HOSPITAL | Age: 85
End: 2025-09-09
Payer: COMMERCIAL

## 2025-09-11 ENCOUNTER — APPOINTMENT (OUTPATIENT)
Age: 85
End: 2025-09-11
Payer: COMMERCIAL